# Patient Record
Sex: FEMALE | Race: WHITE | NOT HISPANIC OR LATINO | Employment: PART TIME | URBAN - METROPOLITAN AREA
[De-identification: names, ages, dates, MRNs, and addresses within clinical notes are randomized per-mention and may not be internally consistent; named-entity substitution may affect disease eponyms.]

---

## 2017-02-22 ENCOUNTER — HISTORICAL (OUTPATIENT)
Dept: ADMINISTRATIVE | Facility: HOSPITAL | Age: 57
End: 2017-02-22

## 2017-02-22 LAB
CHOLEST SERPL-MCNC: 228 MG/DL
CHOLEST/HDLC SERPL: 4.9 {RATIO} (ref 0–4.4)
DEPRECATED CALCIDIOL+CALCIFEROL SERPL-MC: 25.47 NG/ML (ref 30–80)
HDLC SERPL-MCNC: 47 MG/DL
LDLC SERPL CALC-MCNC: 143 MG/DL (ref 0–130)
TRIGL SERPL-MCNC: 191 MG/DL
VLDLC SERPL CALC-MCNC: 38 MG/DL

## 2018-02-14 ENCOUNTER — HISTORICAL (OUTPATIENT)
Dept: INTERNAL MEDICINE | Facility: CLINIC | Age: 58
End: 2018-02-14

## 2018-02-14 LAB
ABS NEUT (OLG): 3.88 X10(3)/MCL (ref 2.1–9.2)
ALBUMIN SERPL-MCNC: 4.2 GM/DL (ref 3.4–5)
ALBUMIN/GLOB SERPL: 1 RATIO (ref 1–2)
ALP SERPL-CCNC: 87 UNIT/L (ref 45–117)
ALT SERPL-CCNC: 23 UNIT/L (ref 12–78)
APPEARANCE, UA: CLEAR
AST SERPL-CCNC: 20 UNIT/L (ref 15–37)
BACTERIA #/AREA URNS AUTO: ABNORMAL /[HPF]
BASOPHILS # BLD AUTO: 0.01 X10(3)/MCL
BASOPHILS NFR BLD AUTO: 0 % (ref 0–1)
BILIRUB SERPL-MCNC: 0.6 MG/DL (ref 0.2–1)
BILIRUB UR QL STRIP: NEGATIVE
BILIRUBIN DIRECT+TOT PNL SERPL-MCNC: 0.2 MG/DL
BILIRUBIN DIRECT+TOT PNL SERPL-MCNC: 0.4 MG/DL
BUN SERPL-MCNC: 14 MG/DL (ref 7–18)
CALCIUM SERPL-MCNC: 9.5 MG/DL (ref 8.5–10.1)
CHLORIDE SERPL-SCNC: 106 MMOL/L (ref 98–107)
CHOLEST SERPL-MCNC: 187 MG/DL
CHOLEST/HDLC SERPL: 2.9 {RATIO} (ref 0–4.4)
CO2 SERPL-SCNC: 31 MMOL/L (ref 21–32)
COLOR UR: YELLOW
CREAT SERPL-MCNC: 0.5 MG/DL (ref 0.6–1.3)
DEPRECATED CALCIDIOL+CALCIFEROL SERPL-MC: 25.71 NG/ML (ref 30–80)
EOSINOPHIL # BLD AUTO: 0.15 10*3/UL
EOSINOPHIL NFR BLD AUTO: 2 % (ref 0–5)
ERYTHROCYTE [DISTWIDTH] IN BLOOD BY AUTOMATED COUNT: 12.2 % (ref 11.5–14.5)
EST. AVERAGE GLUCOSE BLD GHB EST-MCNC: 105 MG/DL
GLOBULIN SER-MCNC: 3.4 GM/ML (ref 2.3–3.5)
GLUCOSE (UA): NORMAL
GLUCOSE SERPL-MCNC: 87 MG/DL (ref 74–106)
HAV IGM SERPL QL IA: NONREACTIVE
HBA1C MFR BLD: 5.3 % (ref 4.2–6.3)
HBV CORE IGM SERPL QL IA: NONREACTIVE
HBV SURFACE AG SERPL QL IA: NEGATIVE
HCT VFR BLD AUTO: 36.9 % (ref 35–46)
HCV AB SERPL QL IA: NONREACTIVE
HDLC SERPL-MCNC: 64 MG/DL
HGB BLD-MCNC: 12.4 GM/DL (ref 12–16)
HGB UR QL STRIP: NEGATIVE
HIV 1+2 AB+HIV1 P24 AG SERPL QL IA: NONREACTIVE
HYALINE CASTS #/AREA URNS LPF: ABNORMAL /[LPF]
IMM GRANULOCYTES # BLD AUTO: 0.02 10*3/UL
IMM GRANULOCYTES NFR BLD AUTO: 0 %
KETONES UR QL STRIP: NEGATIVE
LDLC SERPL CALC-MCNC: 104 MG/DL (ref 0–130)
LEUKOCYTE ESTERASE UR QL STRIP: NEGATIVE
LYMPHOCYTES # BLD AUTO: 1.96 X10(3)/MCL
LYMPHOCYTES NFR BLD AUTO: 31 % (ref 15–40)
MCH RBC QN AUTO: 30.5 PG (ref 26–34)
MCHC RBC AUTO-ENTMCNC: 33.6 GM/DL (ref 31–37)
MCV RBC AUTO: 90.7 FL (ref 80–100)
MONOCYTES # BLD AUTO: 0.37 X10(3)/MCL
MONOCYTES NFR BLD AUTO: 6 % (ref 4–12)
NEUTROPHILS # BLD AUTO: 3.88 X10(3)/MCL
NEUTROPHILS NFR BLD AUTO: 61 X10(3)/MCL
NITRITE UR QL STRIP: NEGATIVE
PH UR STRIP: 7 [PH] (ref 4.5–8)
PLATELET # BLD AUTO: 198 X10(3)/MCL (ref 130–400)
PMV BLD AUTO: 10.7 FL (ref 7.4–10.4)
POTASSIUM SERPL-SCNC: 3.5 MMOL/L (ref 3.5–5.1)
PROT SERPL-MCNC: 7.6 GM/DL (ref 6.4–8.2)
PROT UR QL STRIP: NEGATIVE
RBC # BLD AUTO: 4.07 X10(6)/MCL (ref 4–5.2)
RBC #/AREA URNS AUTO: ABNORMAL /[HPF]
SODIUM SERPL-SCNC: 141 MMOL/L (ref 136–145)
SP GR UR STRIP: 1.02 (ref 1–1.03)
SQUAMOUS #/AREA URNS LPF: ABNORMAL /[LPF]
TRIGL SERPL-MCNC: 95 MG/DL
TSH SERPL-ACNC: 1.79 MIU/L (ref 0.36–3.74)
UROBILINOGEN UR STRIP-ACNC: NORMAL
VLDLC SERPL CALC-MCNC: 19 MG/DL
WBC # SPEC AUTO: 6.4 X10(3)/MCL (ref 4.5–11)
WBC #/AREA URNS AUTO: ABNORMAL /HPF

## 2019-02-15 ENCOUNTER — HISTORICAL (OUTPATIENT)
Dept: RADIOLOGY | Facility: HOSPITAL | Age: 59
End: 2019-02-15

## 2019-03-13 ENCOUNTER — HISTORICAL (OUTPATIENT)
Dept: INTERNAL MEDICINE | Facility: CLINIC | Age: 59
End: 2019-03-13

## 2019-03-13 LAB
ABS NEUT (OLG): 4.34 X10(3)/MCL (ref 2.1–9.2)
ALBUMIN SERPL-MCNC: 4 GM/DL (ref 3.4–5)
ALBUMIN/GLOB SERPL: 1.1 RATIO (ref 1.1–2)
ALP SERPL-CCNC: 95 UNIT/L (ref 45–117)
ALT SERPL-CCNC: 24 UNIT/L (ref 12–78)
APPEARANCE, UA: CLEAR
AST SERPL-CCNC: 16 UNIT/L (ref 15–37)
BACTERIA #/AREA URNS AUTO: ABNORMAL /[HPF]
BASOPHILS # BLD AUTO: 0.01 X10(3)/MCL
BASOPHILS NFR BLD AUTO: 0 %
BILIRUB SERPL-MCNC: 0.6 MG/DL (ref 0.2–1)
BILIRUB UR QL STRIP: NEGATIVE
BILIRUBIN DIRECT+TOT PNL SERPL-MCNC: 0.1 MG/DL
BILIRUBIN DIRECT+TOT PNL SERPL-MCNC: 0.5 MG/DL
BUN SERPL-MCNC: 15 MG/DL (ref 7–18)
CALCIUM SERPL-MCNC: 9.2 MG/DL (ref 8.5–10.1)
CHLORIDE SERPL-SCNC: 106 MMOL/L (ref 98–107)
CHOLEST SERPL-MCNC: 225 MG/DL
CHOLEST/HDLC SERPL: 3.4 {RATIO} (ref 0–4.4)
CO2 SERPL-SCNC: 30 MMOL/L (ref 21–32)
COLOR UR: YELLOW
CREAT SERPL-MCNC: 0.5 MG/DL (ref 0.6–1.3)
EOSINOPHIL # BLD AUTO: 0.13 10*3/UL
EOSINOPHIL NFR BLD AUTO: 2 %
ERYTHROCYTE [DISTWIDTH] IN BLOOD BY AUTOMATED COUNT: 12.3 % (ref 11.5–14.5)
EST. AVERAGE GLUCOSE BLD GHB EST-MCNC: 105 MG/DL
GLOBULIN SER-MCNC: 3.6 GM/ML (ref 2.3–3.5)
GLUCOSE (UA): NORMAL
GLUCOSE SERPL-MCNC: 89 MG/DL (ref 74–106)
HAV IGM SERPL QL IA: NONREACTIVE
HBA1C MFR BLD: 5.3 % (ref 4.2–6.3)
HBV CORE IGM SERPL QL IA: NONREACTIVE
HBV SURFACE AG SERPL QL IA: NEGATIVE
HCT VFR BLD AUTO: 42.9 % (ref 35–46)
HCV AB SERPL QL IA: NONREACTIVE
HDLC SERPL-MCNC: 67 MG/DL
HGB BLD-MCNC: 13.6 GM/DL (ref 12–16)
HGB UR QL STRIP: NEGATIVE
HIV 1+2 AB+HIV1 P24 AG SERPL QL IA: NONREACTIVE
HYALINE CASTS #/AREA URNS LPF: ABNORMAL /[LPF]
IMM GRANULOCYTES # BLD AUTO: 0.02 10*3/UL
IMM GRANULOCYTES NFR BLD AUTO: 0 %
KETONES UR QL STRIP: NEGATIVE
LDLC SERPL CALC-MCNC: 132 MG/DL (ref 0–130)
LEUKOCYTE ESTERASE UR QL STRIP: NEGATIVE
LYMPHOCYTES # BLD AUTO: 1.71 X10(3)/MCL
LYMPHOCYTES NFR BLD AUTO: 26 % (ref 13–40)
MCH RBC QN AUTO: 29.2 PG (ref 26–34)
MCHC RBC AUTO-ENTMCNC: 31.7 GM/DL (ref 31–37)
MCV RBC AUTO: 92.3 FL (ref 80–100)
MONOCYTES # BLD AUTO: 0.49 X10(3)/MCL
MONOCYTES NFR BLD AUTO: 7 % (ref 4–12)
NEUTROPHILS # BLD AUTO: 4.34 X10(3)/MCL
NEUTROPHILS NFR BLD AUTO: 65 X10(3)/MCL
NITRITE UR QL STRIP: NEGATIVE
PH UR STRIP: 7.5 [PH] (ref 4.5–8)
PLATELET # BLD AUTO: 217 X10(3)/MCL (ref 130–400)
PMV BLD AUTO: 10.8 FL (ref 7.4–10.4)
POTASSIUM SERPL-SCNC: 4.1 MMOL/L (ref 3.5–5.1)
PROT SERPL-MCNC: 7.6 GM/DL (ref 6.4–8.2)
PROT UR QL STRIP: 10 MG/DL
RBC # BLD AUTO: 4.65 X10(6)/MCL (ref 4–5.2)
RBC #/AREA URNS AUTO: ABNORMAL /[HPF]
SODIUM SERPL-SCNC: 141 MMOL/L (ref 136–145)
SP GR UR STRIP: 1.02 (ref 1–1.03)
SQUAMOUS #/AREA URNS LPF: >100 /[LPF]
TRIGL SERPL-MCNC: 128 MG/DL
TSH SERPL-ACNC: 3.12 MIU/L (ref 0.36–3.74)
UROBILINOGEN UR STRIP-ACNC: NORMAL
VLDLC SERPL CALC-MCNC: 26 MG/DL
WBC # SPEC AUTO: 6.7 X10(3)/MCL (ref 4.5–11)
WBC #/AREA URNS AUTO: ABNORMAL /HPF

## 2020-02-06 ENCOUNTER — HISTORICAL (OUTPATIENT)
Dept: ENDOSCOPY | Facility: HOSPITAL | Age: 60
End: 2020-02-06

## 2020-02-06 LAB — CRC RECOMMENDATION EXT: NORMAL

## 2020-03-23 ENCOUNTER — HISTORICAL (OUTPATIENT)
Dept: ADMINISTRATIVE | Facility: HOSPITAL | Age: 60
End: 2020-03-23

## 2020-08-21 ENCOUNTER — HISTORICAL (OUTPATIENT)
Dept: INTERNAL MEDICINE | Facility: CLINIC | Age: 60
End: 2020-08-21

## 2020-08-21 LAB
ABS NEUT (OLG): 5.03 X10(3)/MCL (ref 2.1–9.2)
ALBUMIN SERPL-MCNC: 4 GM/DL (ref 3.4–5)
ALBUMIN/GLOB SERPL: 1.1 RATIO (ref 1.1–2)
ALP SERPL-CCNC: 79 UNIT/L (ref 45–117)
ALT SERPL-CCNC: 22 UNIT/L (ref 12–78)
APPEARANCE, UA: CLEAR
AST SERPL-CCNC: 15 UNIT/L (ref 15–37)
BACTERIA #/AREA URNS AUTO: ABNORMAL /HPF
BASOPHILS # BLD AUTO: 0 X10(3)/MCL (ref 0–0.2)
BASOPHILS NFR BLD AUTO: 0 %
BILIRUB SERPL-MCNC: 0.4 MG/DL (ref 0.2–1)
BILIRUB UR QL STRIP: NEGATIVE
BILIRUBIN DIRECT+TOT PNL SERPL-MCNC: 0.1 MG/DL (ref 0–0.2)
BILIRUBIN DIRECT+TOT PNL SERPL-MCNC: 0.3 MG/DL
BUN SERPL-MCNC: 12 MG/DL (ref 7–18)
CALCIUM SERPL-MCNC: 9.1 MG/DL (ref 8.5–10.1)
CHLORIDE SERPL-SCNC: 109 MMOL/L (ref 98–107)
CHOLEST SERPL-MCNC: 210 MG/DL
CHOLEST/HDLC SERPL: 3.3 {RATIO} (ref 0–4.4)
CO2 SERPL-SCNC: 29 MMOL/L (ref 21–32)
COLOR UR: YELLOW
CREAT SERPL-MCNC: 0.5 MG/DL (ref 0.6–1.3)
CREAT UR-MCNC: 143 MG/DL
DEPRECATED CALCIDIOL+CALCIFEROL SERPL-MC: 37.8 NG/ML (ref 30–80)
EOSINOPHIL # BLD AUTO: 0.2 X10(3)/MCL (ref 0–0.9)
EOSINOPHIL NFR BLD AUTO: 3 %
ERYTHROCYTE [DISTWIDTH] IN BLOOD BY AUTOMATED COUNT: 12.4 % (ref 11.5–14.5)
EST. AVERAGE GLUCOSE BLD GHB EST-MCNC: 105 MG/DL
GLOBULIN SER-MCNC: 3.6 GM/ML (ref 2.3–3.5)
GLUCOSE (UA): NEGATIVE
GLUCOSE SERPL-MCNC: 97 MG/DL (ref 74–106)
HAV IGM SERPL QL IA: NONREACTIVE
HBA1C MFR BLD: 5.3 % (ref 4.2–6.3)
HBV CORE IGM SERPL QL IA: NONREACTIVE
HBV SURFACE AG SERPL QL IA: NONREACTIVE
HCT VFR BLD AUTO: 37.6 % (ref 35–46)
HCV AB SERPL QL IA: NONREACTIVE
HDLC SERPL-MCNC: 64 MG/DL (ref 40–59)
HGB BLD-MCNC: 12.3 GM/DL (ref 12–16)
HGB UR QL STRIP: 0.1
HIV 1+2 AB+HIV1 P24 AG SERPL QL IA: NONREACTIVE
HYALINE CASTS #/AREA URNS LPF: ABNORMAL /LPF
IMM GRANULOCYTES # BLD AUTO: 0.01 10*3/UL
IMM GRANULOCYTES NFR BLD AUTO: 0 %
KETONES UR QL STRIP: NEGATIVE
LDLC SERPL CALC-MCNC: 129 MG/DL
LEUKOCYTE ESTERASE UR QL STRIP: 75 LEU/UL
LYMPHOCYTES # BLD AUTO: 1.9 X10(3)/MCL (ref 0.6–4.6)
LYMPHOCYTES NFR BLD AUTO: 25 %
MCH RBC QN AUTO: 30.1 PG (ref 26–34)
MCHC RBC AUTO-ENTMCNC: 32.7 GM/DL (ref 31–37)
MCV RBC AUTO: 91.9 FL (ref 80–100)
MICROALBUMIN UR-MCNC: 14.1 MG/L (ref 0–19)
MICROALBUMIN/CREAT RATIO PNL UR: 9.9 MCG/MG CR (ref 0–29)
MONOCYTES # BLD AUTO: 0.5 X10(3)/MCL (ref 0.1–1.3)
MONOCYTES NFR BLD AUTO: 7 %
NEUTROPHILS # BLD AUTO: 5.03 X10(3)/MCL (ref 2.1–9.2)
NEUTROPHILS NFR BLD AUTO: 65 %
NITRITE UR QL STRIP: NEGATIVE
PH UR STRIP: 5.5 [PH] (ref 4.5–8)
PLATELET # BLD AUTO: 203 X10(3)/MCL (ref 130–400)
PMV BLD AUTO: 10.2 FL (ref 7.4–10.4)
POTASSIUM SERPL-SCNC: 3.6 MMOL/L (ref 3.5–5.1)
PROT SERPL-MCNC: 7.6 GM/DL (ref 6.4–8.2)
PROT UR QL STRIP: 10 MG/DL
RBC # BLD AUTO: 4.09 X10(6)/MCL (ref 4–5.2)
RBC #/AREA URNS AUTO: ABNORMAL /HPF
SODIUM SERPL-SCNC: 144 MMOL/L (ref 136–145)
SP GR UR STRIP: 1.03 (ref 1–1.03)
SQUAMOUS #/AREA URNS LPF: ABNORMAL /LPF
TRIGL SERPL-MCNC: 87 MG/DL
TSH SERPL-ACNC: 2.67 MIU/L (ref 0.36–3.74)
UROBILINOGEN UR STRIP-ACNC: 2 MG/DL
VLDLC SERPL CALC-MCNC: 17 MG/DL
WBC # SPEC AUTO: 7.7 X10(3)/MCL (ref 4.5–11)
WBC #/AREA URNS AUTO: ABNORMAL /HPF

## 2020-09-16 ENCOUNTER — HISTORICAL (OUTPATIENT)
Dept: ADMINISTRATIVE | Facility: HOSPITAL | Age: 60
End: 2020-09-16

## 2020-09-16 LAB
CRP SERPL-MCNC: <0.3 MG/DL
ERYTHROCYTE [SEDIMENTATION RATE] IN BLOOD: 8 MM/HR (ref 0–20)
RHEUMATOID FACT SERPL-ACNC: <13 IU/ML

## 2020-10-27 ENCOUNTER — HISTORICAL (OUTPATIENT)
Dept: RADIOLOGY | Facility: HOSPITAL | Age: 60
End: 2020-10-27

## 2020-10-29 ENCOUNTER — HISTORICAL (OUTPATIENT)
Dept: RADIOLOGY | Facility: HOSPITAL | Age: 60
End: 2020-10-29

## 2020-11-02 ENCOUNTER — HISTORICAL (OUTPATIENT)
Dept: RADIOLOGY | Facility: HOSPITAL | Age: 60
End: 2020-11-02

## 2020-12-02 ENCOUNTER — HISTORICAL (OUTPATIENT)
Dept: SLEEP MEDICINE | Facility: HOSPITAL | Age: 60
End: 2020-12-02

## 2020-12-03 ENCOUNTER — HISTORICAL (OUTPATIENT)
Dept: RADIOLOGY | Facility: HOSPITAL | Age: 60
End: 2020-12-03

## 2021-02-19 ENCOUNTER — HISTORICAL (OUTPATIENT)
Dept: INTERNAL MEDICINE | Facility: CLINIC | Age: 61
End: 2021-02-19

## 2021-02-19 LAB
ABS NEUT (OLG): 3.73 X10(3)/MCL (ref 2.1–9.2)
ALBUMIN SERPL-MCNC: 3.9 GM/DL (ref 3.5–5)
ALBUMIN/GLOB SERPL: 1.3 RATIO (ref 1.1–2)
ALP SERPL-CCNC: 68 UNIT/L (ref 40–150)
ALT SERPL-CCNC: 14 UNIT/L (ref 0–55)
AST SERPL-CCNC: 17 UNIT/L (ref 5–34)
BASOPHILS # BLD AUTO: 0 X10(3)/MCL (ref 0–0.2)
BASOPHILS NFR BLD AUTO: 0 %
BILIRUB SERPL-MCNC: 0.5 MG/DL
BILIRUBIN DIRECT+TOT PNL SERPL-MCNC: 0.2 MG/DL (ref 0–0.5)
BILIRUBIN DIRECT+TOT PNL SERPL-MCNC: 0.3 MG/DL (ref 0–0.8)
BUN SERPL-MCNC: 15 MG/DL (ref 9.8–20.1)
CALCIUM SERPL-MCNC: 9.8 MG/DL (ref 8.4–10.2)
CHLORIDE SERPL-SCNC: 104 MMOL/L (ref 98–107)
CO2 SERPL-SCNC: 30 MMOL/L (ref 22–29)
CREAT SERPL-MCNC: 0.64 MG/DL (ref 0.55–1.02)
EOSINOPHIL # BLD AUTO: 0.1 X10(3)/MCL (ref 0–0.9)
EOSINOPHIL NFR BLD AUTO: 2 %
ERYTHROCYTE [DISTWIDTH] IN BLOOD BY AUTOMATED COUNT: 12.3 % (ref 11.5–14.5)
GLOBULIN SER-MCNC: 2.9 GM/DL (ref 2.4–3.5)
GLUCOSE SERPL-MCNC: 105 MG/DL (ref 74–100)
HCT VFR BLD AUTO: 37.7 % (ref 35–46)
HGB BLD-MCNC: 11.9 GM/DL (ref 12–16)
IMM GRANULOCYTES # BLD AUTO: 0.01 10*3/UL
IMM GRANULOCYTES NFR BLD AUTO: 0 %
LYMPHOCYTES # BLD AUTO: 1.6 X10(3)/MCL (ref 0.6–4.6)
LYMPHOCYTES NFR BLD AUTO: 27 %
MCH RBC QN AUTO: 29.5 PG (ref 26–34)
MCHC RBC AUTO-ENTMCNC: 31.6 GM/DL (ref 31–37)
MCV RBC AUTO: 93.5 FL (ref 80–100)
MONOCYTES # BLD AUTO: 0.4 X10(3)/MCL (ref 0.1–1.3)
MONOCYTES NFR BLD AUTO: 7 %
NEUTROPHILS # BLD AUTO: 3.73 X10(3)/MCL (ref 2.1–9.2)
NEUTROPHILS NFR BLD AUTO: 64 %
PLATELET # BLD AUTO: 186 X10(3)/MCL (ref 130–400)
PMV BLD AUTO: 10.5 FL (ref 7.4–10.4)
POTASSIUM SERPL-SCNC: 4.1 MMOL/L (ref 3.5–5.1)
PROT SERPL-MCNC: 6.8 GM/DL (ref 6.4–8.3)
RBC # BLD AUTO: 4.03 X10(6)/MCL (ref 4–5.2)
SODIUM SERPL-SCNC: 142 MMOL/L (ref 136–145)
WBC # SPEC AUTO: 5.9 X10(3)/MCL (ref 4.5–11)

## 2021-09-21 ENCOUNTER — HISTORICAL (OUTPATIENT)
Dept: INTERNAL MEDICINE | Facility: CLINIC | Age: 61
End: 2021-09-21

## 2021-09-21 LAB
ABS NEUT (OLG): 3.8 X10(3)/MCL (ref 2.1–9.2)
ALBUMIN SERPL-MCNC: 4 GM/DL (ref 3.5–5)
ALBUMIN/GLOB SERPL: 1.3 RATIO (ref 1.1–2)
ALP SERPL-CCNC: 71 UNIT/L (ref 40–150)
ALT SERPL-CCNC: 16 UNIT/L (ref 0–55)
AST SERPL-CCNC: 18 UNIT/L (ref 5–34)
BASOPHILS # BLD AUTO: 0 X10(3)/MCL (ref 0–0.2)
BASOPHILS NFR BLD AUTO: 0 %
BILIRUB SERPL-MCNC: 0.7 MG/DL
BILIRUBIN DIRECT+TOT PNL SERPL-MCNC: 0.2 MG/DL (ref 0–0.5)
BILIRUBIN DIRECT+TOT PNL SERPL-MCNC: 0.5 MG/DL (ref 0–0.8)
BUN SERPL-MCNC: 16.7 MG/DL (ref 9.8–20.1)
CALCIUM SERPL-MCNC: 9.8 MG/DL (ref 8.4–10.2)
CHLORIDE SERPL-SCNC: 106 MMOL/L (ref 98–107)
CHOLEST SERPL-MCNC: 199 MG/DL
CHOLEST/HDLC SERPL: 4 {RATIO} (ref 0–5)
CO2 SERPL-SCNC: 30 MMOL/L (ref 22–29)
CREAT SERPL-MCNC: 0.61 MG/DL (ref 0.55–1.02)
EOSINOPHIL # BLD AUTO: 0.3 X10(3)/MCL (ref 0–0.9)
EOSINOPHIL NFR BLD AUTO: 4 %
ERYTHROCYTE [DISTWIDTH] IN BLOOD BY AUTOMATED COUNT: 12.4 % (ref 11.5–14.5)
EST. AVERAGE GLUCOSE BLD GHB EST-MCNC: 96.8 MG/DL
GLOBULIN SER-MCNC: 3 GM/DL (ref 2.4–3.5)
GLUCOSE SERPL-MCNC: 90 MG/DL (ref 74–100)
HBA1C MFR BLD: 5 %
HCT VFR BLD AUTO: 38.1 % (ref 35–46)
HDLC SERPL-MCNC: 54 MG/DL (ref 35–60)
HGB BLD-MCNC: 12.2 GM/DL (ref 12–16)
IMM GRANULOCYTES # BLD AUTO: 0.02 10*3/UL
IMM GRANULOCYTES NFR BLD AUTO: 0 %
LDLC SERPL CALC-MCNC: 129 MG/DL (ref 50–140)
LYMPHOCYTES # BLD AUTO: 1.3 X10(3)/MCL (ref 0.6–4.6)
LYMPHOCYTES NFR BLD AUTO: 22 %
MCH RBC QN AUTO: 30 PG (ref 26–34)
MCHC RBC AUTO-ENTMCNC: 32 GM/DL (ref 31–37)
MCV RBC AUTO: 93.6 FL (ref 80–100)
MONOCYTES # BLD AUTO: 0.5 X10(3)/MCL (ref 0.1–1.3)
MONOCYTES NFR BLD AUTO: 8 %
NEUTROPHILS # BLD AUTO: 3.8 X10(3)/MCL (ref 2.1–9.2)
NEUTROPHILS NFR BLD AUTO: 65 %
NRBC BLD AUTO-RTO: 0 % (ref 0–0.2)
PLATELET # BLD AUTO: 198 X10(3)/MCL (ref 130–400)
PMV BLD AUTO: 10.6 FL (ref 7.4–10.4)
POTASSIUM SERPL-SCNC: 4.3 MMOL/L (ref 3.5–5.1)
PROT SERPL-MCNC: 7 GM/DL (ref 6.4–8.3)
RBC # BLD AUTO: 4.07 X10(6)/MCL (ref 4–5.2)
SODIUM SERPL-SCNC: 142 MMOL/L (ref 136–145)
TRIGL SERPL-MCNC: 79 MG/DL (ref 37–140)
TSH SERPL-ACNC: 2.11 UIU/ML (ref 0.35–4.94)
VLDLC SERPL CALC-MCNC: 16 MG/DL
WBC # SPEC AUTO: 5.9 X10(3)/MCL (ref 4.5–11)

## 2021-12-06 ENCOUNTER — HISTORICAL (OUTPATIENT)
Dept: RADIOLOGY | Facility: HOSPITAL | Age: 61
End: 2021-12-06

## 2022-04-11 ENCOUNTER — HISTORICAL (OUTPATIENT)
Dept: ADMINISTRATIVE | Facility: HOSPITAL | Age: 62
End: 2022-04-11
Payer: COMMERCIAL

## 2022-04-27 VITALS
OXYGEN SATURATION: 98 % | DIASTOLIC BLOOD PRESSURE: 79 MMHG | WEIGHT: 169.75 LBS | BODY MASS INDEX: 30.08 KG/M2 | HEIGHT: 63 IN | SYSTOLIC BLOOD PRESSURE: 119 MMHG

## 2022-09-13 ENCOUNTER — LAB VISIT (OUTPATIENT)
Dept: LAB | Facility: HOSPITAL | Age: 62
End: 2022-09-13
Attending: NURSE PRACTITIONER
Payer: COMMERCIAL

## 2022-09-13 DIAGNOSIS — M85.80 OSTEOPENIA, UNSPECIFIED LOCATION: ICD-10-CM

## 2022-09-13 DIAGNOSIS — R23.2 HOT FLASHES: ICD-10-CM

## 2022-09-13 DIAGNOSIS — M06.9 RHEUMATOID ARTHRITIS, INVOLVING UNSPECIFIED SITE, UNSPECIFIED WHETHER RHEUMATOID FACTOR PRESENT: ICD-10-CM

## 2022-09-13 DIAGNOSIS — Z00.00 WELL ADULT EXAM: Primary | ICD-10-CM

## 2022-09-13 DIAGNOSIS — I87.2 VENOUS REFLUX: ICD-10-CM

## 2022-09-13 LAB
ALBUMIN SERPL-MCNC: 4.1 GM/DL (ref 3.4–4.8)
ALBUMIN/GLOB SERPL: 1.2 RATIO (ref 1.1–2)
ALP SERPL-CCNC: 89 UNIT/L (ref 40–150)
ALT SERPL-CCNC: 15 UNIT/L (ref 0–55)
AST SERPL-CCNC: 16 UNIT/L (ref 5–34)
BASOPHILS # BLD AUTO: 0.02 X10(3)/MCL (ref 0–0.2)
BASOPHILS NFR BLD AUTO: 0.3 %
BILIRUBIN DIRECT+TOT PNL SERPL-MCNC: 0.8 MG/DL
BUN SERPL-MCNC: 13.9 MG/DL (ref 9.8–20.1)
CALCIUM SERPL-MCNC: 10 MG/DL (ref 8.4–10.2)
CHLORIDE SERPL-SCNC: 103 MMOL/L (ref 98–107)
CHOLEST SERPL-MCNC: 219 MG/DL
CHOLEST/HDLC SERPL: 4 {RATIO} (ref 0–5)
CO2 SERPL-SCNC: 28 MMOL/L (ref 23–31)
CREAT SERPL-MCNC: 0.59 MG/DL (ref 0.55–1.02)
DEPRECATED CALCIDIOL+CALCIFEROL SERPL-MC: 36.6 NG/ML (ref 30–80)
EOSINOPHIL # BLD AUTO: 0.17 X10(3)/MCL (ref 0–0.9)
EOSINOPHIL NFR BLD AUTO: 2.5 %
ERYTHROCYTE [DISTWIDTH] IN BLOOD BY AUTOMATED COUNT: 12.4 % (ref 11.5–17)
EST. AVERAGE GLUCOSE BLD GHB EST-MCNC: 99.7 MG/DL
GFR SERPLBLD CREATININE-BSD FMLA CKD-EPI: >60 MLS/MIN/1.73/M2
GLOBULIN SER-MCNC: 3.5 GM/DL (ref 2.4–3.5)
GLUCOSE SERPL-MCNC: 88 MG/DL (ref 82–115)
HBA1C MFR BLD: 5.1 %
HCT VFR BLD AUTO: 40.7 % (ref 37–47)
HDLC SERPL-MCNC: 49 MG/DL (ref 35–60)
HGB BLD-MCNC: 13.2 GM/DL (ref 12–16)
IMM GRANULOCYTES # BLD AUTO: 0.03 X10(3)/MCL (ref 0–0.04)
IMM GRANULOCYTES NFR BLD AUTO: 0.4 %
LDLC SERPL CALC-MCNC: 146 MG/DL (ref 50–140)
LYMPHOCYTES # BLD AUTO: 1.66 X10(3)/MCL (ref 0.6–4.6)
LYMPHOCYTES NFR BLD AUTO: 24.4 %
MCH RBC QN AUTO: 29.7 PG (ref 27–31)
MCHC RBC AUTO-ENTMCNC: 32.4 MG/DL (ref 33–36)
MCV RBC AUTO: 91.5 FL (ref 80–94)
MONOCYTES # BLD AUTO: 0.52 X10(3)/MCL (ref 0.1–1.3)
MONOCYTES NFR BLD AUTO: 7.7 %
NEUTROPHILS # BLD AUTO: 4.4 X10(3)/MCL (ref 2.1–9.2)
NEUTROPHILS NFR BLD AUTO: 64.7 %
NRBC BLD AUTO-RTO: 0 %
PLATELET # BLD AUTO: 205 X10(3)/MCL (ref 130–400)
PMV BLD AUTO: 11.1 FL (ref 7.4–10.4)
POTASSIUM SERPL-SCNC: 4.1 MMOL/L (ref 3.5–5.1)
PROT SERPL-MCNC: 7.6 GM/DL (ref 5.8–7.6)
RBC # BLD AUTO: 4.45 X10(6)/MCL (ref 4.2–5.4)
SODIUM SERPL-SCNC: 143 MMOL/L (ref 136–145)
TRIGL SERPL-MCNC: 119 MG/DL (ref 37–140)
TSH SERPL-ACNC: 3.39 UIU/ML (ref 0.35–4.94)
VLDLC SERPL CALC-MCNC: 24 MG/DL
WBC # SPEC AUTO: 6.8 X10(3)/MCL (ref 4.5–11.5)

## 2022-09-13 PROCEDURE — 80053 COMPREHEN METABOLIC PANEL: CPT

## 2022-09-13 PROCEDURE — 36415 COLL VENOUS BLD VENIPUNCTURE: CPT

## 2022-09-13 PROCEDURE — 82306 VITAMIN D 25 HYDROXY: CPT

## 2022-09-13 PROCEDURE — 84443 ASSAY THYROID STIM HORMONE: CPT

## 2022-09-13 PROCEDURE — 80061 LIPID PANEL: CPT

## 2022-09-13 PROCEDURE — 83036 HEMOGLOBIN GLYCOSYLATED A1C: CPT

## 2022-09-13 PROCEDURE — 85025 COMPLETE CBC W/AUTO DIFF WBC: CPT

## 2022-09-19 ENCOUNTER — OFFICE VISIT (OUTPATIENT)
Dept: INTERNAL MEDICINE | Facility: CLINIC | Age: 62
End: 2022-09-19
Payer: COMMERCIAL

## 2022-09-19 VITALS
TEMPERATURE: 98 F | DIASTOLIC BLOOD PRESSURE: 68 MMHG | HEIGHT: 62 IN | SYSTOLIC BLOOD PRESSURE: 121 MMHG | RESPIRATION RATE: 16 BRPM | WEIGHT: 179.38 LBS | HEART RATE: 65 BPM | BODY MASS INDEX: 33.01 KG/M2

## 2022-09-19 DIAGNOSIS — Z23 FLU VACCINE NEED: ICD-10-CM

## 2022-09-19 DIAGNOSIS — M19.90 OSTEOARTHRITIS, UNSPECIFIED OSTEOARTHRITIS TYPE, UNSPECIFIED SITE: ICD-10-CM

## 2022-09-19 DIAGNOSIS — Z00.00 WELLNESS EXAMINATION: Primary | ICD-10-CM

## 2022-09-19 DIAGNOSIS — E66.9 CLASS 1 OBESITY WITHOUT SERIOUS COMORBIDITY WITH BODY MASS INDEX (BMI) OF 32.0 TO 32.9 IN ADULT, UNSPECIFIED OBESITY TYPE: ICD-10-CM

## 2022-09-19 DIAGNOSIS — E78.5 HYPERLIPIDEMIA, UNSPECIFIED HYPERLIPIDEMIA TYPE: ICD-10-CM

## 2022-09-19 DIAGNOSIS — R23.2 HOT FLASHES: ICD-10-CM

## 2022-09-19 DIAGNOSIS — Z12.31 VISIT FOR SCREENING MAMMOGRAM: ICD-10-CM

## 2022-09-19 DIAGNOSIS — Z78.0 POSTMENOPAUSE: ICD-10-CM

## 2022-09-19 DIAGNOSIS — M85.80 OSTEOPENIA, UNSPECIFIED LOCATION: ICD-10-CM

## 2022-09-19 PROBLEM — I87.2 VENOUS REFLUX: Status: ACTIVE | Noted: 2022-09-19

## 2022-09-19 PROBLEM — M65.312 TRIGGER THUMB, LEFT THUMB: Status: ACTIVE | Noted: 2022-09-19

## 2022-09-19 PROBLEM — M06.9 RHEUMATOID ARTHRITIS: Status: ACTIVE | Noted: 2022-09-19

## 2022-09-19 PROBLEM — G56.00 CTS (CARPAL TUNNEL SYNDROME): Status: ACTIVE | Noted: 2022-09-19

## 2022-09-19 PROBLEM — M72.2 PLANTAR FASCIITIS: Status: ACTIVE | Noted: 2022-09-19

## 2022-09-19 PROBLEM — R06.83 SNORING: Status: ACTIVE | Noted: 2022-09-19

## 2022-09-19 PROBLEM — E66.811 CLASS 1 OBESITY WITHOUT SERIOUS COMORBIDITY IN ADULT: Status: ACTIVE | Noted: 2022-09-19

## 2022-09-19 PROCEDURE — 99396 PREV VISIT EST AGE 40-64: CPT | Mod: S$PBB,,, | Performed by: NURSE PRACTITIONER

## 2022-09-19 PROCEDURE — 3008F PR BODY MASS INDEX (BMI) DOCUMENTED: ICD-10-PCS | Mod: CPTII,,, | Performed by: NURSE PRACTITIONER

## 2022-09-19 PROCEDURE — 1159F MED LIST DOCD IN RCRD: CPT | Mod: CPTII,,, | Performed by: NURSE PRACTITIONER

## 2022-09-19 PROCEDURE — 99215 OFFICE O/P EST HI 40 MIN: CPT | Mod: PBBFAC | Performed by: NURSE PRACTITIONER

## 2022-09-19 PROCEDURE — 3078F DIAST BP <80 MM HG: CPT | Mod: CPTII,,, | Performed by: NURSE PRACTITIONER

## 2022-09-19 PROCEDURE — 3078F PR MOST RECENT DIASTOLIC BLOOD PRESSURE < 80 MM HG: ICD-10-PCS | Mod: CPTII,,, | Performed by: NURSE PRACTITIONER

## 2022-09-19 PROCEDURE — 99396 PR PREVENTIVE VISIT,EST,40-64: ICD-10-PCS | Mod: S$PBB,,, | Performed by: NURSE PRACTITIONER

## 2022-09-19 PROCEDURE — 3074F PR MOST RECENT SYSTOLIC BLOOD PRESSURE < 130 MM HG: ICD-10-PCS | Mod: CPTII,,, | Performed by: NURSE PRACTITIONER

## 2022-09-19 PROCEDURE — 3074F SYST BP LT 130 MM HG: CPT | Mod: CPTII,,, | Performed by: NURSE PRACTITIONER

## 2022-09-19 PROCEDURE — 3008F BODY MASS INDEX DOCD: CPT | Mod: CPTII,,, | Performed by: NURSE PRACTITIONER

## 2022-09-19 PROCEDURE — 1160F PR REVIEW ALL MEDS BY PRESCRIBER/CLIN PHARMACIST DOCUMENTED: ICD-10-PCS | Mod: CPTII,,, | Performed by: NURSE PRACTITIONER

## 2022-09-19 PROCEDURE — 1160F RVW MEDS BY RX/DR IN RCRD: CPT | Mod: CPTII,,, | Performed by: NURSE PRACTITIONER

## 2022-09-19 PROCEDURE — 90471 IMMUNIZATION ADMIN: CPT | Mod: PBBFAC

## 2022-09-19 PROCEDURE — 1159F PR MEDICATION LIST DOCUMENTED IN MEDICAL RECORD: ICD-10-PCS | Mod: CPTII,,, | Performed by: NURSE PRACTITIONER

## 2022-09-19 RX ORDER — MELOXICAM 7.5 MG/1
7.5 TABLET ORAL DAILY PRN
COMMUNITY
Start: 2022-08-02 | End: 2022-09-19 | Stop reason: SDUPTHER

## 2022-09-19 RX ORDER — VENLAFAXINE HYDROCHLORIDE 75 MG/1
75 CAPSULE, EXTENDED RELEASE ORAL DAILY
COMMUNITY
Start: 2022-09-06 | End: 2022-09-19 | Stop reason: SDUPTHER

## 2022-09-19 RX ORDER — VENLAFAXINE HYDROCHLORIDE 75 MG/1
75 CAPSULE, EXTENDED RELEASE ORAL DAILY
Qty: 30 CAPSULE | Refills: 11 | Status: SHIPPED | OUTPATIENT
Start: 2022-09-19

## 2022-09-19 RX ORDER — MELOXICAM 7.5 MG/1
7.5 TABLET ORAL DAILY PRN
Qty: 30 TABLET | Refills: 11 | Status: SHIPPED | OUTPATIENT
Start: 2022-09-19 | End: 2023-10-23

## 2022-09-19 RX ORDER — CLOTRIMAZOLE AND BETAMETHASONE DIPROPIONATE 10; .64 MG/G; MG/G
CREAM TOPICAL 2 TIMES DAILY
COMMUNITY
Start: 2022-04-11

## 2022-09-19 NOTE — PROGRESS NOTES
Miranda L Natasha, NP   [facility]  [address] [address]      PATIENT NAME: Ashley Ruano  : 1960  DATE: 22  MRN: [id number]      Billing Provider: Miranda Kaur NP  Level of Service: UT PREVENTIVE VISIT,NEW,40-64  Patient PCP Information       Provider PCP Type    Miranda Kaur NP General            Reason for Visit / Chief Complaint: wellness (Flu vaccine)       History of Present Illness / Problem Focused Workflow     Ashley Ruano presents to the clinic with wellness (Flu vaccine)     63 yo WF for wellness with lab results. PMh includes CTS, osteopenia, hot flashes, plantar fasciitis, diverticulosis, colon polyps, venous reflux, and obesity. Wants Flu vaccine today. Labs reviewed. . Wt gain 10#. She is no longer working at [employer] since . She overall feels well except continues with hot flashes that affects her sleep. She is not interested in changing medication at this time but appreciates referral to GYN for further discussion and possible change in therapy.  She has tried numerous OTC remedies without relief. Continues on Effexor. Otherwise, denies any fever, chills, HA, dizziness, cough, SOB, CP, abdominal pain, poor appetite, n/v/d, hematochezia.     Other providers   Dr. Stafford      Review of Systems     Review of Systems   Constitutional: Negative.    HENT: Negative.     Eyes: Negative.    Respiratory: Negative.     Cardiovascular: Negative.    Gastrointestinal: Negative.    Endocrine: Negative.         Hot flashes   Genitourinary: Negative.    Musculoskeletal: Negative.    Skin: Negative.    Allergic/Immunologic: Negative.    Neurological: Negative.    Hematological: Negative.    Psychiatric/Behavioral: Negative.       Medical / Social / Family History   History reviewed. No pertinent past medical history.    Past Surgical History:   Procedure Laterality Date     SECTION       CHOLECYSTECTOMY      DILATION AND CURETTAGE OF UTERUS  1978    HYSTERECTOMY      INSERTION, STENT, ARTERY, FEMOROPOPLITEAL  08/23/2021    TONSILLECTOMY         Social History  Ms. Ramirez  reports that she has never smoked. She has never used smokeless tobacco. She reports that she does not currently use alcohol. She reports that she does not use drugs.    Family History  Ms. Ramirez's family history includes Blindness in her mother; Colon cancer in her maternal grandmother; Deafness in her mother; Diabetes in her father; Heart disease in her father; Hypertension in her mother.    Medications and Allergies     Medications  Medication List with Changes/Refills   Current Medications    CLOTRIMAZOLE-BETAMETHASONE 1-0.05% (LOTRISONE) CREAM    Apply topically 2 (two) times daily.   Changed and/or Refilled Medications    Modified Medication Previous Medication    MELOXICAM (MOBIC) 7.5 MG TABLET meloxicam (MOBIC) 7.5 MG tablet       Take 1 tablet (7.5 mg total) by mouth daily as needed for Pain. Take with food/ milk. Avoid other NSAIDs    Take 7.5 mg by mouth daily as needed.    VENLAFAXINE (EFFEXOR-XR) 75 MG 24 HR CAPSULE venlafaxine (EFFEXOR-XR) 75 MG 24 hr capsule       Take 1 capsule (75 mg total) by mouth once daily.    Take 75 mg by mouth once daily.       Allergies  Review of patient's allergies indicates:   Allergen Reactions    Grass pollen-june grass standard Itching    Mayonnaise Nausea And Vomiting       Physical Examination     Vitals:    09/19/22 0812   BP: 121/68   Pulse: 65   Resp: 16   Temp: 98.1 °F (36.7 °C)     Physical Exam  Vitals and nursing note reviewed.   Constitutional:       Appearance: Normal appearance. She is not ill-appearing.   HENT:      Head: Normocephalic.      Right Ear: Tympanic membrane normal.      Left Ear: Tympanic membrane normal.      Nose: Nose normal.      Mouth/Throat:      Mouth: Mucous membranes are moist.   Eyes:      Extraocular Movements: Extraocular movements intact.       Conjunctiva/sclera: Conjunctivae normal.      Pupils: Pupils are equal, round, and reactive to light.   Cardiovascular:      Rate and Rhythm: Normal rate and regular rhythm.      Pulses: Normal pulses.   Pulmonary:      Effort: Pulmonary effort is normal. No respiratory distress.      Breath sounds: Normal breath sounds.   Abdominal:      General: Bowel sounds are normal. There is no distension.      Palpations: Abdomen is soft. There is no mass.      Tenderness: There is no abdominal tenderness.      Hernia: No hernia is present.   Musculoskeletal:         General: Normal range of motion.      Cervical back: Normal range of motion and neck supple.      Right lower leg: No edema.      Left lower leg: No edema.   Skin:     General: Skin is warm and dry.      Capillary Refill: Capillary refill takes less than 2 seconds.   Neurological:      Mental Status: She is alert and oriented to person, place, and time. Mental status is at baseline.      Motor: No weakness.   Psychiatric:         Mood and Affect: Mood normal.         Behavior: Behavior normal.         Thought Content: Thought content normal.         Judgment: Judgment normal.         Results     Lab Results   Component Value Date    WBC 6.8 09/13/2022    RBC 4.45 09/13/2022    HGB 13.2 09/13/2022    HCT 40.7 09/13/2022    MCV 91.5 09/13/2022    MCH 29.7 09/13/2022    MCHC 32.4 (L) 09/13/2022    RDW 12.4 09/13/2022     09/13/2022    MPV 11.1 (H) 09/13/2022     CMP  Sodium Level   Date Value Ref Range Status   09/13/2022 143 136 - 145 mmol/L Final     Potassium Level   Date Value Ref Range Status   09/13/2022 4.1 3.5 - 5.1 mmol/L Final     Carbon Dioxide   Date Value Ref Range Status   09/13/2022 28 23 - 31 mmol/L Final     Blood Urea Nitrogen   Date Value Ref Range Status   09/13/2022 13.9 9.8 - 20.1 mg/dL Final     Creatinine   Date Value Ref Range Status   09/13/2022 0.59 0.55 - 1.02 mg/dL Final     Calcium Level Total   Date Value Ref Range Status    09/13/2022 10.0 8.4 - 10.2 mg/dL Final     Albumin Level   Date Value Ref Range Status   09/13/2022 4.1 3.4 - 4.8 gm/dL Final     Bilirubin Total   Date Value Ref Range Status   09/13/2022 0.8 <=1.5 mg/dL Final     Alkaline Phosphatase   Date Value Ref Range Status   09/13/2022 89 40 - 150 unit/L Final     Aspartate Aminotransferase   Date Value Ref Range Status   09/13/2022 16 5 - 34 unit/L Final     Alanine Aminotransferase   Date Value Ref Range Status   09/13/2022 15 0 - 55 unit/L Final     Estimated GFR-Non    Date Value Ref Range Status   09/21/2021 >105 >>=90 mL/min/1.73 m2 Final     Lab Results   Component Value Date    CHOL 219 (H) 09/13/2022     Lab Results   Component Value Date    HDL 49 09/13/2022     No results found for: LDLCALC  Lab Results   Component Value Date    TRIG 119 09/13/2022     No results found for: CHOLHDL  Lab Results   Component Value Date    TSH 3.3864 09/13/2022     Lab Results   Component Value Date    PHUR 5.5 08/21/2020    PROTEINUA 10 (A) 08/21/2020    GLUCUA Negative 08/21/2020    KETONESU Negative 08/21/2020    OCCULTUA 0.1 08/21/2020    NITRITE Negative 08/21/2020    LEUKOCYTESUR 75 (A) 08/21/2020           Assessment and Plan (including Health Maintenance)     Plan:         Health Maintenance Due   Topic Date Due    Hepatitis C Screening  Never done    Shingles Vaccine (1 of 2) Never done    COVID-19 Vaccine (3 - Booster for Moderna series) 10/11/2021       Problem List Items Addressed This Visit          Cardiac/Vascular    HLD (hyperlipidemia)    Current Assessment & Plan       Avoid tobacco/ alcohol  Follow low fat/low cholesterol diet such as avoid/ decrease lopez, sausage, fried foods, cookies, cakes, chips, cheese, whole milk, butter, mayonnaise. Add olive oil, avocados, lean meats, fresh fruits/ vegetables, heart healthy nuts to diet.  Educated on health benefits of exercise 5 days/ week of at least 30 minutes moderate intensity exercise (brisk  walking) and 2 days/ week of muscle strength activities                Endocrine    Class 1 obesity without serious comorbidity in adult    Current Assessment & Plan     BMI 32.81, wt gain 10#  Educated on increased risk of disease s/t obesity.  Educated on health benefits of at least 5 days/ week of 30 minutes moderate intensity exercise (brisk walking) and 2 or more days/ week of muscle strength activities (as tolerated).  Eat well balanced diet of fresh fruits/ vegetables, whole grains, lean meats and limit high carbohydrate foods.            Relevant Orders    CBC Auto Differential    Comprehensive Metabolic Panel       Orthopedic    Osteoarthritis    Current Assessment & Plan     Stable. Continue with Meloxicam prn         Relevant Medications    meloxicam (MOBIC) 7.5 MG tablet    Other Relevant Orders    CBC Auto Differential    Comprehensive Metabolic Panel    Osteopenia    Current Assessment & Plan     DEXA 2019  She agrees to complete, ordered  Not taking calcium, she states pills are too big; instructed on chewable options   Daily calcium and vitamin D supplementation: Calcium 1500 mg daily; high calcium foods include salmon or sardines with bones, low fat yogurt, skim milk, green vegetables, and cheese. Vitamin D 3 2000 IU once daily; high vitamin D foods include mushrooms, fish oil, cod liver, salmon, tuna, egg yolks, milk fortified with Vitamin D and foods fortified with Vitamin D such as cereals and oatmeal.  Decrease/ Avoid use of alcohol, tobacco use, caffeine.  Educated on health benefits of at least 5 days/ week of 30 minutes moderate intensity exercise (brisk walking) and 2 or more days/ week of muscle strength activities  Fall precautions discussed such as removing scatter and clutter in home, use hand rails when walking up stairs, proper foot wear, and use of bright lights in home especially at night.            Relevant Orders    CBC Auto Differential    Comprehensive Metabolic Panel     Hemoglobin A1C    TSH    Vitamin D       Other    Hot flashes  Offered change in medication therapy, however patient agrees to referral to GYN for further discussion. Can continue with Effexor for now.    Relevant Medications    venlafaxine (EFFEXOR-XR) 75 MG 24 hr capsule    Other Relevant Orders    Ambulatory referral/consult to Gynecology     Other Visit Diagnoses       Wellness examination    -  Primary  Avoid tobacco/ alcohol/ drugs  Regular exercise as tolerated  Healthy lifestyle habits  Wellness labs completed and reviewed  MMG due 12/7/22  CRC screening UTD, due 2/2025  DEXA ordered     Relevant Orders    CBC Auto Differential    Comprehensive Metabolic Panel    Hemoglobin A1C    Lipid Panel    TSH    Vitamin D    Visit for screening mammogram        Relevant Orders    Mammo Digital Screening Bilat w/ Ilya    Flu vaccine need        Postmenopause        Relevant Orders    DXA Bone Density Spine And Hip    Vitamin D            Health Maintenance Topics with due status: Not Due       Topic Last Completion Date    TETANUS VACCINE 01/19/2018    Colorectal Cancer Screening 02/06/2020    Mammogram 12/06/2021    Lipid Panel 09/13/2022       Future Appointments   Date Time Provider Department Center   9/19/2023  7:00 AM Miranda Kaur NP [facility]      Follow up in a year or sooner if needed for wellness with labs.      Signature:  Miranda Kaur NP  [facility]  [address] [address]    Date of encounter: 9/19/22

## 2022-09-19 NOTE — ASSESSMENT & PLAN NOTE
Avoid tobacco/ alcohol  Follow low fat/low cholesterol diet such as avoid/ decrease lopez, sausage, fried foods, cookies, cakes, chips, cheese, whole milk, butter, mayonnaise. Add olive oil, avocados, lean meats, fresh fruits/ vegetables, heart healthy nuts to diet.  Educated on health benefits of exercise 5 days/ week of at least 30 minutes moderate intensity exercise (brisk walking) and 2 days/ week of muscle strength activities

## 2022-09-19 NOTE — ASSESSMENT & PLAN NOTE
BMI 32.81, wt gain 10#  Educated on increased risk of disease s/t obesity.  Educated on health benefits of at least 5 days/ week of 30 minutes moderate intensity exercise (brisk walking) and 2 or more days/ week of muscle strength activities (as tolerated).  Eat well balanced diet of fresh fruits/ vegetables, whole grains, lean meats and limit high carbohydrate foods.

## 2022-09-19 NOTE — ASSESSMENT & PLAN NOTE
DEXA 2019  She agrees to complete, ordered  Not taking calcium, she states pills are too big; instructed on chewable options   Daily calcium and vitamin D supplementation: Calcium 1500 mg daily; high calcium foods include salmon or sardines with bones, low fat yogurt, skim milk, green vegetables, and cheese. Vitamin D 3 2000 IU once daily; high vitamin D foods include mushrooms, fish oil, cod liver, salmon, tuna, egg yolks, milk fortified with Vitamin D and foods fortified with Vitamin D such as cereals and oatmeal.  Decrease/ Avoid use of alcohol, tobacco use, caffeine.  Educated on health benefits of at least 5 days/ week of 30 minutes moderate intensity exercise (brisk walking) and 2 or more days/ week of muscle strength activities  Fall precautions discussed such as removing scatter and clutter in home, use hand rails when walking up stairs, proper foot wear, and use of bright lights in home especially at night.

## 2022-11-28 ENCOUNTER — DOCUMENTATION ONLY (OUTPATIENT)
Dept: ADMINISTRATIVE | Facility: HOSPITAL | Age: 62
End: 2022-11-28
Payer: COMMERCIAL

## 2022-12-07 ENCOUNTER — TELEPHONE (OUTPATIENT)
Dept: INTERNAL MEDICINE | Facility: CLINIC | Age: 62
End: 2022-12-07
Payer: COMMERCIAL

## 2022-12-07 ENCOUNTER — HOSPITAL ENCOUNTER (OUTPATIENT)
Dept: RADIOLOGY | Facility: HOSPITAL | Age: 62
Discharge: HOME | End: 2022-12-07
Attending: NURSE PRACTITIONER
Payer: COMMERCIAL

## 2022-12-07 DIAGNOSIS — Z12.31 VISIT FOR SCREENING MAMMOGRAM: ICD-10-CM

## 2022-12-07 DIAGNOSIS — Z78.0 POSTMENOPAUSE: ICD-10-CM

## 2022-12-07 PROCEDURE — 77063 BREAST TOMOSYNTHESIS BI: CPT | Mod: 26,,, | Performed by: RADIOLOGY

## 2022-12-07 PROCEDURE — 77063 MAMMO DIGITAL SCREENING BILAT WITH TOMO: ICD-10-PCS | Mod: 26,,, | Performed by: RADIOLOGY

## 2022-12-07 PROCEDURE — 77067 SCR MAMMO BI INCL CAD: CPT | Mod: TC

## 2022-12-07 PROCEDURE — 77067 MAMMO DIGITAL SCREENING BILAT WITH TOMO: ICD-10-PCS | Mod: 26,,, | Performed by: RADIOLOGY

## 2022-12-07 PROCEDURE — 77080 DXA BONE DENSITY AXIAL: CPT | Mod: TC

## 2022-12-07 PROCEDURE — 77067 SCR MAMMO BI INCL CAD: CPT | Mod: 26,,, | Performed by: RADIOLOGY

## 2022-12-07 NOTE — TELEPHONE ENCOUNTER
Please let patient know DEXA (bone density scan) shows improvement in lumbar spine however mild progression of osteopenia in femurs. Encourage continued calcium/ vitamin D supplementation daily, avoid excessive caffeine/ tobacco/ alcohol and encourage regular weight bearing activities/ exercises. Fall precautions advised.     Please let me know if she has any further questions/ concerns.     Thanks

## 2023-02-27 ENCOUNTER — OFFICE VISIT (OUTPATIENT)
Dept: GYNECOLOGY | Facility: CLINIC | Age: 63
End: 2023-02-27
Payer: COMMERCIAL

## 2023-02-27 VITALS
HEART RATE: 67 BPM | DIASTOLIC BLOOD PRESSURE: 73 MMHG | SYSTOLIC BLOOD PRESSURE: 140 MMHG | TEMPERATURE: 98 F | HEIGHT: 63 IN | RESPIRATION RATE: 16 BRPM | BODY MASS INDEX: 31.54 KG/M2 | WEIGHT: 178 LBS

## 2023-02-27 DIAGNOSIS — R23.2 HOT FLASHES: ICD-10-CM

## 2023-02-27 PROCEDURE — 99214 OFFICE O/P EST MOD 30 MIN: CPT | Mod: PBBFAC

## 2023-02-27 RX ORDER — GABAPENTIN 300 MG/1
300 CAPSULE ORAL NIGHTLY
Qty: 90 CAPSULE | Refills: 3 | Status: SHIPPED | OUTPATIENT
Start: 2023-02-27 | End: 2024-02-27

## 2023-02-27 NOTE — PROGRESS NOTES
[facility]  [facility]  [address] [place] [address]  Phone: [phone]  Fax: [phone]    Subjective:     Ashley Ruano is a 62 y.o.  who presents  for hot flashes for the past 25 years. Patient had been started on Effexor with little relief so she discontinued. Hot flashes affect her sleep. Also endorses bilateral carpal tunnel and nerve pain. Denies episodes of vaginal bleeding or discharge.     GYN Hx:  S/p total hysterectomy 25+ years ago. Per patient, she had a hydatidiform mole. Vasomotor symptoms happened after this  Denies h/o abnormal paps  Denies h/o STI    MedHx:   Past Medical History:   Diagnosis Date    Personal history of colonic polyps 2020    Dr Yao Diaz       SurgHx:   Past Surgical History:   Procedure Laterality Date     SECTION      CHOLECYSTECTOMY      COLONOSCOPY  2020    Dr. Yao Diaz    DILATION AND CURETTAGE OF UTERUS      HYSTERECTOMY      INSERTION, STENT, ARTERY, FEMOROPOPLITEAL  2021    TONSILLECTOMY         Medications:   Current Outpatient Medications   Medication Instructions    clotrimazole-betamethasone 1-0.05% (LOTRISONE) cream Topical (Top), 2 times daily    gabapentin (NEURONTIN) 300 mg, Oral, Nightly    meloxicam (MOBIC) 7.5 mg, Oral, Daily PRN, Take with food/ milk. Avoid other NSAIDs    venlafaxine (EFFEXOR-XR) 75 mg, Oral, Daily       FM Hx:   Family History   Problem Relation Age of Onset    Hypertension Mother     Deafness Mother     Blindness Mother     Heart disease Father     Diabetes Father     Colon cancer Maternal Grandmother       Denies hx of ovarian, uterine, endometrial, or colon cancer.    Social Hx:   Social History     Tobacco Use    Smoking status: Never    Smokeless tobacco: Never   Substance Use Topics    Alcohol use: Not Currently    Drug use: Never      Denies tobacco, alcohol and illicit drug usage.    Review of Systems  Denies fevers, chills, headache, blurry vision, nausea,  "vomiting, dizziness, or syncope.Denies chest pain, shortness of breath, RUQ pain, or calf pain.    Objective:     Vitals:    23 0958   BP: (!) 140/73   BP Location: Left arm   Patient Position: Sitting   BP Method: Medium (Automatic)   Pulse: 67   Resp: 16   Temp: 98.2 °F (36.8 °C)   TempSrc: Oral   Weight: 80.7 kg (178 lb)   Height: 5' 3" (1.6 m)     Body mass index is 31.53 kg/m².    Physical Exam:     General: alert and oriented, in no acute distress  Lungs: clear to auscultation bilaterally, no conversational dyspnea  Heart: RRR  Abdomen: Soft, non-distended, non tender to palpation, no involuntary guarding, no rebound tenderness normoactive bowel sounds    Note: RN chaperone present for entirety of exam.    Labs  No results found for this or any previous visit (from the past 24 hour(s)).      Imaging  No images are attached to the encounter.    HCM: Has primary NP   Paps: Has not had since hysterectomy. Denies abnormal paps   Mammogram: UTD  Colonoscopy: UTD    Assessment:   62 y.o.  here for vasomotor symptoms     Plan:     Problem List Items Addressed This Visit          Other    Hot flashes     Patient trialed effexor with little improvement  Given nerve symptoms along with vasomotor, will trial gabapentin nightly. Patient worried about effects while driving so will start with only at night.   Counseled on sleep hygiene including loose fitting clothing while sleeping, using a fan.                 Patient and plan were discussed with Dr. Vega.    Meredith Reed MD   [facility], PGY-2      "

## 2023-02-27 NOTE — ASSESSMENT & PLAN NOTE
Patient trialed effexor with little improvement  Given nerve symptoms along with vasomotor, will trial gabapentin nightly. Patient worried about effects while driving so will start with only at night.   Counseled on sleep hygiene including loose fitting clothing while sleeping, using a fan.

## 2023-09-18 ENCOUNTER — LAB VISIT (OUTPATIENT)
Dept: LAB | Facility: HOSPITAL | Age: 63
End: 2023-09-18
Attending: NURSE PRACTITIONER
Payer: COMMERCIAL

## 2023-09-18 DIAGNOSIS — Z00.00 WELLNESS EXAMINATION: ICD-10-CM

## 2023-09-18 DIAGNOSIS — Z78.0 POSTMENOPAUSE: ICD-10-CM

## 2023-09-18 DIAGNOSIS — M85.80 OSTEOPENIA, UNSPECIFIED LOCATION: ICD-10-CM

## 2023-09-18 LAB
DEPRECATED CALCIDIOL+CALCIFEROL SERPL-MC: 35.5 NG/ML (ref 30–80)
EST. AVERAGE GLUCOSE BLD GHB EST-MCNC: 99.7 MG/DL
HBA1C MFR BLD: 5.1 %

## 2023-09-18 PROCEDURE — 36415 COLL VENOUS BLD VENIPUNCTURE: CPT

## 2023-09-18 PROCEDURE — 83036 HEMOGLOBIN GLYCOSYLATED A1C: CPT

## 2023-09-18 PROCEDURE — 82306 VITAMIN D 25 HYDROXY: CPT

## 2023-09-19 ENCOUNTER — TELEPHONE (OUTPATIENT)
Dept: INTERNAL MEDICINE | Facility: CLINIC | Age: 63
End: 2023-09-19

## 2023-09-19 ENCOUNTER — HOSPITAL ENCOUNTER (OUTPATIENT)
Dept: RADIOLOGY | Facility: HOSPITAL | Age: 63
Discharge: HOME | End: 2023-09-19
Attending: NURSE PRACTITIONER
Payer: COMMERCIAL

## 2023-09-19 ENCOUNTER — OFFICE VISIT (OUTPATIENT)
Dept: INTERNAL MEDICINE | Facility: CLINIC | Age: 63
End: 2023-09-19
Payer: COMMERCIAL

## 2023-09-19 VITALS
SYSTOLIC BLOOD PRESSURE: 156 MMHG | RESPIRATION RATE: 20 BRPM | DIASTOLIC BLOOD PRESSURE: 78 MMHG | BODY MASS INDEX: 29.41 KG/M2 | TEMPERATURE: 98 F | HEART RATE: 60 BPM | HEIGHT: 63 IN | WEIGHT: 166 LBS

## 2023-09-19 DIAGNOSIS — G89.29 CHRONIC PAIN OF LEFT KNEE: ICD-10-CM

## 2023-09-19 DIAGNOSIS — G89.29 CHRONIC HEEL PAIN, RIGHT: ICD-10-CM

## 2023-09-19 DIAGNOSIS — Z23 IMMUNIZATION DUE: ICD-10-CM

## 2023-09-19 DIAGNOSIS — E78.2 MIXED HYPERLIPIDEMIA: ICD-10-CM

## 2023-09-19 DIAGNOSIS — Z00.00 WELLNESS EXAMINATION: Primary | ICD-10-CM

## 2023-09-19 DIAGNOSIS — M25.562 CHRONIC PAIN OF LEFT KNEE: ICD-10-CM

## 2023-09-19 DIAGNOSIS — I10 PRIMARY HYPERTENSION: ICD-10-CM

## 2023-09-19 DIAGNOSIS — M65.311 TRIGGER FINGER OF RIGHT THUMB: ICD-10-CM

## 2023-09-19 DIAGNOSIS — M06.9 RHEUMATOID ARTHRITIS, INVOLVING UNSPECIFIED SITE, UNSPECIFIED WHETHER RHEUMATOID FACTOR PRESENT: ICD-10-CM

## 2023-09-19 DIAGNOSIS — M79.671 CHRONIC HEEL PAIN, RIGHT: ICD-10-CM

## 2023-09-19 DIAGNOSIS — R23.2 HOT FLASHES: ICD-10-CM

## 2023-09-19 DIAGNOSIS — Z12.31 VISIT FOR SCREENING MAMMOGRAM: ICD-10-CM

## 2023-09-19 DIAGNOSIS — E07.89 THYROID PAIN: ICD-10-CM

## 2023-09-19 DIAGNOSIS — M19.90 OSTEOARTHRITIS, UNSPECIFIED OSTEOARTHRITIS TYPE, UNSPECIFIED SITE: ICD-10-CM

## 2023-09-19 DIAGNOSIS — M25.551 RIGHT HIP PAIN: ICD-10-CM

## 2023-09-19 PROBLEM — M65.30 TRIGGER FINGER OF RIGHT HAND: Status: ACTIVE | Noted: 2023-09-19

## 2023-09-19 PROBLEM — E66.811 CLASS 1 OBESITY WITHOUT SERIOUS COMORBIDITY IN ADULT: Status: RESOLVED | Noted: 2022-09-19 | Resolved: 2023-09-19

## 2023-09-19 PROBLEM — E66.9 CLASS 1 OBESITY WITHOUT SERIOUS COMORBIDITY IN ADULT: Status: RESOLVED | Noted: 2022-09-19 | Resolved: 2023-09-19

## 2023-09-19 PROCEDURE — 3044F PR MOST RECENT HEMOGLOBIN A1C LEVEL <7.0%: ICD-10-PCS | Mod: CPTII,,, | Performed by: NURSE PRACTITIONER

## 2023-09-19 PROCEDURE — 73130 X-RAY EXAM OF HAND: CPT | Mod: TC,RT

## 2023-09-19 PROCEDURE — 1159F MED LIST DOCD IN RCRD: CPT | Mod: CPTII,,, | Performed by: NURSE PRACTITIONER

## 2023-09-19 PROCEDURE — 73562 X-RAY EXAM OF KNEE 3: CPT | Mod: TC,LT

## 2023-09-19 PROCEDURE — 99212 OFFICE O/P EST SF 10 MIN: CPT | Mod: S$PBB,25,, | Performed by: NURSE PRACTITIONER

## 2023-09-19 PROCEDURE — 3078F DIAST BP <80 MM HG: CPT | Mod: CPTII,,, | Performed by: NURSE PRACTITIONER

## 2023-09-19 PROCEDURE — 99212 PR OFFICE/OUTPT VISIT, EST, LEVL II, 10-19 MIN: ICD-10-PCS | Mod: S$PBB,25,, | Performed by: NURSE PRACTITIONER

## 2023-09-19 PROCEDURE — 90750 HZV VACC RECOMBINANT IM: CPT | Mod: PBBFAC

## 2023-09-19 PROCEDURE — 73630 X-RAY EXAM OF FOOT: CPT | Mod: TC,RT

## 2023-09-19 PROCEDURE — 1160F PR REVIEW ALL MEDS BY PRESCRIBER/CLIN PHARMACIST DOCUMENTED: ICD-10-PCS | Mod: CPTII,,, | Performed by: NURSE PRACTITIONER

## 2023-09-19 PROCEDURE — 3044F HG A1C LEVEL LT 7.0%: CPT | Mod: CPTII,,, | Performed by: NURSE PRACTITIONER

## 2023-09-19 PROCEDURE — 99215 OFFICE O/P EST HI 40 MIN: CPT | Mod: PBBFAC,25 | Performed by: NURSE PRACTITIONER

## 2023-09-19 PROCEDURE — 3077F PR MOST RECENT SYSTOLIC BLOOD PRESSURE >= 140 MM HG: ICD-10-PCS | Mod: CPTII,,, | Performed by: NURSE PRACTITIONER

## 2023-09-19 PROCEDURE — 99396 PR PREVENTIVE VISIT,EST,40-64: ICD-10-PCS | Mod: S$PBB,,, | Performed by: NURSE PRACTITIONER

## 2023-09-19 PROCEDURE — 90472 IMMUNIZATION ADMIN EACH ADD: CPT | Mod: PBBFAC

## 2023-09-19 PROCEDURE — 3077F SYST BP >= 140 MM HG: CPT | Mod: CPTII,,, | Performed by: NURSE PRACTITIONER

## 2023-09-19 PROCEDURE — 99396 PREV VISIT EST AGE 40-64: CPT | Mod: S$PBB,,, | Performed by: NURSE PRACTITIONER

## 2023-09-19 PROCEDURE — 73502 X-RAY EXAM HIP UNI 2-3 VIEWS: CPT | Mod: TC,RT

## 2023-09-19 PROCEDURE — 3008F PR BODY MASS INDEX (BMI) DOCUMENTED: ICD-10-PCS | Mod: CPTII,,, | Performed by: NURSE PRACTITIONER

## 2023-09-19 PROCEDURE — 90686 IIV4 VACC NO PRSV 0.5 ML IM: CPT | Mod: PBBFAC

## 2023-09-19 PROCEDURE — 3008F BODY MASS INDEX DOCD: CPT | Mod: CPTII,,, | Performed by: NURSE PRACTITIONER

## 2023-09-19 PROCEDURE — 1160F RVW MEDS BY RX/DR IN RCRD: CPT | Mod: CPTII,,, | Performed by: NURSE PRACTITIONER

## 2023-09-19 PROCEDURE — 1159F PR MEDICATION LIST DOCUMENTED IN MEDICAL RECORD: ICD-10-PCS | Mod: CPTII,,, | Performed by: NURSE PRACTITIONER

## 2023-09-19 PROCEDURE — 3078F PR MOST RECENT DIASTOLIC BLOOD PRESSURE < 80 MM HG: ICD-10-PCS | Mod: CPTII,,, | Performed by: NURSE PRACTITIONER

## 2023-09-19 RX ORDER — IBUPROFEN 200 MG
200 TABLET ORAL EVERY 6 HOURS PRN
COMMUNITY
End: 2023-10-23

## 2023-09-19 RX ORDER — ACETAMINOPHEN 500 MG
500 TABLET ORAL EVERY 6 HOURS PRN
COMMUNITY

## 2023-09-19 RX ADMIN — Medication 0.5 ML: at 08:09

## 2023-09-19 NOTE — TELEPHONE ENCOUNTER
Patient signed release of information for Dr. Stafford's records. Please follow up with Dr. Stafford's office on last visit notes/ labs completed within the last 6 months - 1 year.     Please send to me for review.    Thank you

## 2023-09-19 NOTE — PROGRESS NOTES
Miranda L Natasha, NP   [facility]  [address] [address]      PATIENT NAME: Ashley Ruano  : 1960  DATE: 23  MRN: [id number]        Reason for Visit / Chief Complaint: Results, Hand Pain (Right ), Foot Pain (right), and Knee Pain (left)       History of Present Illness / Problem Focused Workflow     Ashley Ruano presents to the clinic with Results, Hand Pain (Right ), Foot Pain (right), and Knee Pain (left)     64 yo WF for wellness with lab results. PMh includes CTS, osteopenia, osteoarthritis, hot flashes, plantar fasciitis, diverticulosis, colon polyps, venous reflux, and obesity. Overall feeling okay except generalized joint pain. Labs reviewed. A1c wnl. Vit D wnl. Wants Flu and Shingrix vaccines today. Last colonoscopy . MMG due 2023.     She is c/o right hip pain, right hand thumb trigger finger (right hand dominant), left knee and right foot/heel pain. She has h/o heel spurs and received injection previously with podiatrist that lasted a while until recently. She feels because of her right heel pain it is causing gait difficulty with subsequent left knee and right hip pain. She takes Tylenol with little relief. Has not thought to try Meloxicam. She has Gabapentin prescribed per GYN provider 2023 for hot flashes. Denies any fever, chills, night sweats, body aches, weakness, CP, SOB, sore throat, blurred vision, tinnitus, abd pain, n/v/d, bloody stools, vaginal bleeding, hematuria, lower extremity swelling/ numbness/ tingling.     Other providers   Dr. Stafford  [facility]        Review of Systems     Review of Systems   Constitutional: Negative.    HENT: Negative.     Eyes: Negative.    Respiratory: Negative.     Cardiovascular: Negative.    Gastrointestinal: Negative.    Endocrine: Negative.    Genitourinary: Negative.    Musculoskeletal:  Positive for arthralgias, joint swelling and myalgias.   Skin: Negative.     Allergic/Immunologic: Negative.    Neurological: Negative.    Hematological: Negative.    Psychiatric/Behavioral: Negative.         Medical / Social / Family History     ----------------------------  Personal history of colonic polyps      Comment:  Dr Yao Diaz     Past Surgical History:   Procedure Laterality Date     SECTION      CHOLECYSTECTOMY      COLONOSCOPY  2020    Dr. Yao Diaz    DILATION AND CURETTAGE OF UTERUS      HYSTERECTOMY      INSERTION, STENT, ARTERY, FEMOROPOPLITEAL  2021    TONSILLECTOMY         Social History     Socioeconomic History    Marital status:     Number of children: 3   Occupational History    Occupation:      Comment: [employer]     Comment: Works at [employer]   Tobacco Use    Smoking status: Never    Smokeless tobacco: Never   Substance and Sexual Activity    Alcohol use: Not Currently    Drug use: Never    Sexual activity: Yes     Partners: Male     Social Determinants of Health     Physical Activity: Inactive (2023)    Exercise Vital Sign     Days of Exercise per Week: 0 days     Minutes of Exercise per Session: 0 min        Family History   Problem Relation Age of Onset    Hypertension Mother     Deafness Mother     Blindness Mother     Alzheimer's disease Mother     Heart disease Father     Diabetes Father     Colon cancer Maternal Grandmother         Medications and Allergies     Medications  Current Outpatient Medications   Medication Instructions    acetaminophen (TYLENOL) 500 mg, Oral, Every 6 hours PRN    clotrimazole-betamethasone 1-0.05% (LOTRISONE) cream Topical (Top), 2 times daily    gabapentin (NEURONTIN) 300 mg, Oral, Nightly    ibuprofen (ADVIL,MOTRIN) 200 mg, Oral, Every 6 hours PRN    meloxicam (MOBIC) 7.5 mg, Oral, Daily PRN, Take with food/ milk. Avoid other NSAIDs    venlafaxine (EFFEXOR-XR) 75 mg, Oral, Daily       Allergies  Review of patient's allergies indicates:   Allergen  "Reactions    Grass pollen-june grass standard Itching    [facility] Nausea And Vomiting       Physical Examination     Visit Vitals  BP (!) 156/78 (BP Location: Left arm, Patient Position: Sitting, BP Method: Large (Manual))   Pulse 60   Temp 98.2 °F (36.8 °C) (Oral)   Resp 20   Ht 5' 3" (1.6 m)   Wt 75.3 kg (166 lb)   LMP  (LMP Unknown)   BMI 29.41 kg/m²       Physical Exam  Vitals and nursing note reviewed.   Constitutional:       Appearance: Normal appearance. She is not ill-appearing.   HENT:      Head: Normocephalic.      Right Ear: Tympanic membrane, ear canal and external ear normal.      Left Ear: Tympanic membrane, ear canal and external ear normal.      Nose: Nose normal. No congestion.      Mouth/Throat:      Mouth: Mucous membranes are moist.   Eyes:      Extraocular Movements: Extraocular movements intact.      Conjunctiva/sclera: Conjunctivae normal.      Pupils: Pupils are equal, round, and reactive to light.   Neck:      Thyroid: Thyroid tenderness present. No thyroid mass or thyromegaly.      Vascular: No carotid bruit.   Cardiovascular:      Rate and Rhythm: Normal rate and regular rhythm.      Pulses: Normal pulses.   Pulmonary:      Effort: Pulmonary effort is normal. No respiratory distress.      Breath sounds: Normal breath sounds.   Abdominal:      General: Bowel sounds are normal. There is no distension.      Palpations: Abdomen is soft. There is no mass.      Tenderness: There is no abdominal tenderness.      Hernia: No hernia is present.   Musculoskeletal:         General: Normal range of motion.      Cervical back: Normal range of motion and neck supple. No tenderness.      Right lower leg: No edema.      Left lower leg: No edema.   Lymphadenopathy:      Cervical: No cervical adenopathy.   Skin:     General: Skin is warm and dry.      Capillary Refill: Capillary refill takes less than 2 seconds.   Neurological:      Mental Status: She is alert and oriented to person, place, and time. " "Mental status is at baseline.      Motor: No weakness.   Psychiatric:         Mood and Affect: Mood normal.         Behavior: Behavior normal.         Thought Content: Thought content normal.         Judgment: Judgment normal.           Results     Lab Results   Component Value Date    WBC 6.8 09/13/2022    RBC 4.45 09/13/2022    HGB 13.2 09/13/2022    HCT 40.7 09/13/2022    MCV 91.5 09/13/2022    MCH 29.7 09/13/2022    MCHC 32.4 (L) 09/13/2022    RDW 12.4 09/13/2022     09/13/2022    MPV 11.1 (H) 09/13/2022     Sodium Level   Date Value Ref Range Status   09/13/2022 143 136 - 145 mmol/L Final     Potassium Level   Date Value Ref Range Status   09/13/2022 4.1 3.5 - 5.1 mmol/L Final     Carbon Dioxide   Date Value Ref Range Status   09/13/2022 28 23 - 31 mmol/L Final     Blood Urea Nitrogen   Date Value Ref Range Status   09/13/2022 13.9 9.8 - 20.1 mg/dL Final     Creatinine   Date Value Ref Range Status   09/13/2022 0.59 0.55 - 1.02 mg/dL Final     Calcium Level Total   Date Value Ref Range Status   09/13/2022 10.0 8.4 - 10.2 mg/dL Final     Albumin Level   Date Value Ref Range Status   09/13/2022 4.1 3.4 - 4.8 gm/dL Final     Bilirubin Total   Date Value Ref Range Status   09/13/2022 0.8 <=1.5 mg/dL Final     Alkaline Phosphatase   Date Value Ref Range Status   09/13/2022 89 40 - 150 unit/L Final     Aspartate Aminotransferase   Date Value Ref Range Status   09/13/2022 16 5 - 34 unit/L Final     Alanine Aminotransferase   Date Value Ref Range Status   09/13/2022 15 0 - 55 unit/L Final     Estimated GFR-Non    Date Value Ref Range Status   09/21/2021 >105 >>=90 mL/min/1.73 m2 Final     Lab Results   Component Value Date    CHOL 219 (H) 09/13/2022     Lab Results   Component Value Date    HDL 49 09/13/2022     No results found for: "LDLCALC"  Lab Results   Component Value Date    TRIG 119 09/13/2022     No results found for: "CHOLHDL"  Lab Results   Component Value Date    TSH 3.3864 09/13/2022 " "    Lab Results   Component Value Date    PHUR 5.5 08/21/2020    PROTEINUA 10 (A) 08/21/2020    GLUCUA Negative 08/21/2020    KETONESU Negative 08/21/2020    OCCULTUA 0.1 08/21/2020    NITRITE Negative 08/21/2020    LEUKOCYTESUR 75 (A) 08/21/2020     Lab Results   Component Value Date    HGBA1C 5.1 09/18/2023    HGBA1C 5.1 09/13/2022    HGBA1C 5.0 09/21/2021     No results found for: "MICROALBUR", "XJAG70CHE"   No results found for this or any previous visit.         Assessment       ICD-10-CM ICD-9-CM   1. Wellness examination  Z00.00 V70.0   2. Trigger finger of right thumb  M65.311 727.03   3. Chronic heel pain, right  M79.671 729.5    G89.29 338.29   4. Chronic pain of left knee  M25.562 719.46    G89.29 338.29   5. Right hip pain  M25.551 719.45   6. Primary hypertension  I10 401.9   7. Visit for screening mammogram  Z12.31 V76.12   8. Immunization due  Z23 V05.9   9. Rheumatoid arthritis, involving unspecified site, unspecified whether rheumatoid factor present  M06.9 714.0   10. Osteoarthritis, unspecified osteoarthritis type, unspecified site  M19.90 715.90   11. Hot flashes  R23.2 782.62   12. BMI 29.0-29.9,adult  Z68.29 V85.25   13. Mixed hyperlipidemia  E78.2 272.2       Plan       1. Trigger finger of right thumb  Right hand pain, thumb pain, trigger finger and right hand dominant  XR right hand, pt will return to complete XR  She is interested in PT/ Ortho referral if warrants   Encouraged to take Meloxicam as prescribed prn with food/ milk.   - X-Ray Hand Complete Right; Future    2. Chronic heel pain, right  H/o calcaneal spurs with acute flare up of right heel pain.   Previously received injection per podiatrist with relief and interested in doing so again if able to   XR right foot  - X-Ray Foot Complete Right; Future    3. Chronic pain of left knee  C/o left knee pain and swelling, chronic  XR left knee ordered  Pt interested in PT - [facility]   - X-Ray Knee 3 View Left; Future    4. Right hip " pain  Patient with right hip pain since right foot/ heel pain starting.   XR b/l hips ordered  Pt interested in PT - [facility]   - X-Ray Hip 2 or 3 views Right (with Pelvis when performed); Future    5. Primary hypertension  /78, asymptomatic  Has home BP machine; encouraged to keep log and bring to visit for review in 2 weeks as she is not interested in medication  Follow low sodium diet, < 2 gm/day (avoid high salty foods such as processed meats/ sausage/lopez/ sandwich meat, chips, pickles, cheese, crackers and soft drinks/ electrolyte replacement drinks).  Avoid tobacco/ alcohol use  Educated on health benefits of at least 5 days/ week of 30 minutes moderate intensity exercise (brisk walking) and 2 or more days/ week of muscle strength activities  Daily ASA 81 mg for CV prevention  Continue current medication regimen      6. Wellness examination  Avoid tobacco/alcohol/ drugs  Regular exercise as tolerated  Healthy lifestyle habits  MMG ordered, due 12/2023  Colonoscopy 2020, recall 5 years   Flu/ Shingrix vaccine today     7. Visit for screening mammogram  - Mammo Digital Screening Bilat w/ [facility]; Future    8. Immunization due  - varicella-zoster [id number] (PF)(SHINGRIX) 50 mcg/0.5 mL injection  - Influenza - Quadrivalent *Preferred* (6 months+) (PF)    9. Rheumatoid arthritis, involving unspecified site, unspecified whether rheumatoid factor present  Pt was previously seen by Dr. Hylton about a year ago.     10. Osteoarthritis, unspecified osteoarthritis type, unspecified site  Pt with chronic OA/ RA symptoms.   RICE encouraged  May use Tylenol/ Meloxicam PRN.     11. Hot flashes  She was prescribed Gabapentin per Gyn provider but causes constipation therefore not taking regularly but admits it works.     12. BMI 29.0-29.9,adult  BMI 29.41  Educated on increased risk of disease s/t obesity.  Educated on health benefits of at least 5 days/ week of 30 minutes moderate intensity exercise (brisk walking) and 2  or more days/ week of muscle strength activities (as tolerated).  Eat well balanced diet of fresh fruits/ vegetables, whole grains, lean meats and limit high carbohydrate foods.       13. Mixed hyperlipidemia  She reports recent labs completed at Dr. Stafford's office within the last 6 months with improved readings- need copy of results for review  Avoid tobacco/ alcohol  Follow low fat/low cholesterol diet such as avoid/ decrease lopez, sausage, fried foods, cookies, cakes, chips, cheese, whole milk, butter, mayonnaise. Add olive oil, avocados, lean meats, fresh fruits/ vegetables, heart healthy nuts to diet.  Educated on health benefits of exercise 5 days/ week of at least 30 minutes moderate intensity exercise (brisk walking) and 2 days/ week of muscle strength activities    14. Thyroid pain   Right thyroid tenderness with questionable nodule vs lymph node palpable. US soft tissue neck/ thyroid ordered for eval         Future Appointments   Date Time Provider Department Center   10/16/2023  9:30 AM Miranda Kaur [facility]   11/6/2023  8:45 AM [facility]ID[facility], [facility] GYN [facility] GYN [facility]        Follow up in about 3 weeks (around 10/10/2023) for HTN with BP log .    Signature:  Miranda Kaur NP  [facility]  [address] [address]    Date of encounter: 9/19/23

## 2023-09-21 ENCOUNTER — TELEPHONE (OUTPATIENT)
Dept: INTERNAL MEDICINE | Facility: CLINIC | Age: 63
End: 2023-09-21
Payer: COMMERCIAL

## 2023-09-21 DIAGNOSIS — M15.9 PRIMARY OSTEOARTHRITIS INVOLVING MULTIPLE JOINTS: Primary | ICD-10-CM

## 2023-09-25 ENCOUNTER — TELEPHONE (OUTPATIENT)
Dept: INTERNAL MEDICINE | Facility: CLINIC | Age: 63
End: 2023-09-25
Payer: COMMERCIAL

## 2023-09-25 NOTE — TELEPHONE ENCOUNTER
Notes received from [facility] and reviewed with lipid results from 4/2023. Can we please have lipid results imported into patient's results?     Thank you

## 2023-09-26 NOTE — TELEPHONE ENCOUNTER
Call pt spouse to relay the message he confirmed understanding and will let her know  
Called pt no answer wasn't able to leave a vm  
Please inform pt XR of right foot, right hand, left knee and right hip show arthritis. I placed order for PT to [facility]. If pt does not receive call within 2-3 weeks, pt to notify provider please.     Thank you   
No

## 2023-09-27 ENCOUNTER — TELEPHONE (OUTPATIENT)
Dept: INTERNAL MEDICINE | Facility: CLINIC | Age: 63
End: 2023-09-27
Payer: COMMERCIAL

## 2023-09-27 ENCOUNTER — HOSPITAL ENCOUNTER (OUTPATIENT)
Dept: RADIOLOGY | Facility: HOSPITAL | Age: 63
Discharge: HOME | End: 2023-09-27
Attending: NURSE PRACTITIONER
Payer: COMMERCIAL

## 2023-09-27 DIAGNOSIS — E07.89 THYROID PAIN: ICD-10-CM

## 2023-09-27 PROCEDURE — 76536 US EXAM OF HEAD AND NECK: CPT | Mod: TC

## 2023-09-27 NOTE — TELEPHONE ENCOUNTER
Please let patient know ultrasound of neck/thyroid did not show any abnormal findings.    Thank you

## 2023-10-13 ENCOUNTER — TELEPHONE (OUTPATIENT)
Dept: INTERNAL MEDICINE | Facility: CLINIC | Age: 63
End: 2023-10-13
Payer: COMMERCIAL

## 2023-10-20 DIAGNOSIS — M19.90 OSTEOARTHRITIS, UNSPECIFIED OSTEOARTHRITIS TYPE, UNSPECIFIED SITE: ICD-10-CM

## 2023-10-23 RX ORDER — MELOXICAM 7.5 MG/1
TABLET ORAL
Qty: 30 TABLET | Refills: 11 | Status: SHIPPED | OUTPATIENT
Start: 2023-10-23

## 2023-11-13 ENCOUNTER — TELEPHONE (OUTPATIENT)
Dept: INTERNAL MEDICINE | Facility: CLINIC | Age: 63
End: 2023-11-13
Payer: COMMERCIAL

## 2023-11-13 NOTE — TELEPHONE ENCOUNTER
Pt notified mammogram isnt due til December 7th and gave number for her to call and schedule. Pt verbalized understanding and will call with any questions or concerns

## 2023-11-13 NOTE — TELEPHONE ENCOUNTER
It was ordered at her last visit in September.  She is not due until after December 7th of this year.  She can contact scheduling to schedule at [phone].      Thank

## 2023-12-08 ENCOUNTER — HOSPITAL ENCOUNTER (OUTPATIENT)
Dept: RADIOLOGY | Facility: HOSPITAL | Age: 63
Discharge: HOME | End: 2023-12-08
Attending: NURSE PRACTITIONER
Payer: COMMERCIAL

## 2023-12-08 DIAGNOSIS — Z12.31 VISIT FOR SCREENING MAMMOGRAM: ICD-10-CM

## 2023-12-08 PROCEDURE — 77067 SCR MAMMO BI INCL CAD: CPT | Mod: TC

## 2023-12-08 PROCEDURE — 77067 MAMMO DIGITAL SCREENING BILAT WITH TOMO: ICD-10-PCS | Mod: 26,,, | Performed by: RADIOLOGY

## 2023-12-08 PROCEDURE — 77067 SCR MAMMO BI INCL CAD: CPT | Mod: 26,,, | Performed by: RADIOLOGY

## 2023-12-08 PROCEDURE — 77063 MAMMO DIGITAL SCREENING BILAT WITH TOMO: ICD-10-PCS | Mod: 26,,, | Performed by: RADIOLOGY

## 2023-12-08 PROCEDURE — 77063 BREAST TOMOSYNTHESIS BI: CPT | Mod: 26,,, | Performed by: RADIOLOGY

## 2025-01-07 DIAGNOSIS — Z12.11 COLON CANCER SCREENING: Primary | ICD-10-CM

## 2025-01-07 RX ORDER — POLYETHYLENE GLYCOL 3350, SODIUM SULFATE, SODIUM CHLORIDE, POTASSIUM CHLORIDE, SODIUM ASCORBATE, AND ASCORBIC ACID 7.5-2.691G
KIT ORAL
Qty: 1 KIT | Refills: 0 | Status: SHIPPED | OUTPATIENT
Start: 2025-01-07

## 2025-01-24 RX ORDER — PREGABALIN 150 MG/1
150 CAPSULE ORAL DAILY
COMMUNITY

## 2025-02-05 ENCOUNTER — ANESTHESIA EVENT (OUTPATIENT)
Dept: ENDOSCOPY | Facility: HOSPITAL | Age: 65
End: 2025-02-05
Payer: COMMERCIAL

## 2025-02-05 NOTE — ANESTHESIA PREPROCEDURE EVALUATION
02/05/2025  Ashley Ruano is a 64 y.o., female with PMHx of HTN, HLD, RA  for CLN secondary to h/o colon polyps.    NO BETA BLOCKER USE    Active Ambulatory Problems     Diagnosis Date Noted    Osteoarthritis 09/19/2022    Trigger thumb, left thumb 09/19/2022    CTS (carpal tunnel syndrome) 09/19/2022    Hot flashes 09/19/2022    Osteopenia 09/19/2022    Plantar fasciitis 09/19/2022    Rheumatoid arthritis 09/19/2022    Snoring 09/19/2022    Venous reflux 09/19/2022    HLD (hyperlipidemia) 09/19/2022    Trigger finger of right hand 09/19/2023    Chronic heel pain, right 09/19/2023    Chronic pain of left knee 09/19/2023    Right hip pain 09/19/2023    Primary hypertension 09/19/2023    BMI 29.0-29.9,adult 09/19/2023    Thyroid pain 09/19/2023     Resolved Ambulatory Problems     Diagnosis Date Noted    Class 1 obesity without serious comorbidity in adult 09/19/2022     Past Medical History:   Diagnosis Date    Personal history of colonic polyps 02/06/2020       Pre-op Assessment    I have reviewed the NPO Status.      Review of Systems  Anesthesia Hx:  No problems with previous Anesthesia   History of prior surgery of interest to airway management or planning:             Social:  Non-Smoker       Cardiovascular:     Hypertension, poorly controlled           hyperlipidemia                               Pulmonary:  Pulmonary Normal                       Renal/:  Renal/ Normal                 Hepatic/GI:  Hepatic/GI Normal                    Musculoskeletal:  Arthritis               Neurological:  Neurology Normal                                      Endocrine:  Endocrine Normal              Vitals:    02/06/25 0650 02/06/25 0740   BP: (!) 157/72    BP Location: Left arm    Patient Position: Lying    Pulse: 68    Resp: (!) 68    Temp: 36.8 °C (98.2 °F)    TempSrc: Oral    SpO2: 98% 100%   Weight: 74.4 kg  "(164 lb)    Height: 5' 3" (1.6 m)          Physical Exam  General: Alert, Cooperative and Well nourished    Airway:  Mallampati: II   Mouth Opening: Normal  TM Distance: Normal  Tongue: Normal  Neck ROM: Normal ROM    Dental:  Intact    Chest/Lungs:  Normal Respiratory Rate, Clear to auscultation    Heart:  Rate: Normal  Rhythm: Regular Rhythm  Sounds: Normal      Lab Results   Component Value Date    WBC 6.8 09/13/2022    HGB 13.2 09/13/2022    HCT 40.7 09/13/2022    MCV 91.5 09/13/2022     09/13/2022       CMP  Sodium   Date Value Ref Range Status   09/13/2022 143 136 - 145 mmol/L Final     Potassium   Date Value Ref Range Status   09/13/2022 4.1 3.5 - 5.1 mmol/L Final     Chloride   Date Value Ref Range Status   09/13/2022 103 98 - 107 mmol/L Final     CO2   Date Value Ref Range Status   09/13/2022 28 23 - 31 mmol/L Final     Blood Urea Nitrogen   Date Value Ref Range Status   09/13/2022 13.9 9.8 - 20.1 mg/dL Final     Creatinine   Date Value Ref Range Status   09/13/2022 0.59 0.55 - 1.02 mg/dL Final     Calcium   Date Value Ref Range Status   09/13/2022 10.0 8.4 - 10.2 mg/dL Final     Albumin   Date Value Ref Range Status   09/13/2022 4.1 3.4 - 4.8 gm/dL Final     Bilirubin Total   Date Value Ref Range Status   09/13/2022 0.8 <=1.5 mg/dL Final     ALP   Date Value Ref Range Status   09/13/2022 89 40 - 150 unit/L Final     AST   Date Value Ref Range Status   09/13/2022 16 5 - 34 unit/L Final     ALT   Date Value Ref Range Status   09/13/2022 15 0 - 55 unit/L Final     eGFR   Date Value Ref Range Status   09/13/2022 >60 mls/min/1.73/m2 Final         Anesthesia Plan  Type of Anesthesia, risks & benefits discussed:    Anesthesia Type: Gen Natural Airway  Intra-op Monitoring Plan: Standard ASA Monitors  Post Op Pain Control Plan: IV/PO Opioids PRN  Induction:  IV  Informed Consent: Informed consent signed with the Patient and all parties understand the risks and agree with anesthesia plan.  All questions " answered. Patient consented to blood products? No  ASA Score: 3  Day of Surgery Review of History & Physical: H&P Update referred to the surgeon/provider.    Ready For Surgery From Anesthesia Perspective.     .

## 2025-02-06 ENCOUNTER — ANESTHESIA (OUTPATIENT)
Dept: ENDOSCOPY | Facility: HOSPITAL | Age: 65
End: 2025-02-06
Payer: COMMERCIAL

## 2025-02-06 ENCOUNTER — HOSPITAL ENCOUNTER (OUTPATIENT)
Facility: HOSPITAL | Age: 65
Discharge: HOME | End: 2025-02-06
Attending: INTERNAL MEDICINE | Admitting: INTERNAL MEDICINE
Payer: COMMERCIAL

## 2025-02-06 DIAGNOSIS — Z86.0100 HX OF COLONIC POLYPS: ICD-10-CM

## 2025-02-06 PROCEDURE — 37000008 HC ANESTHESIA 1ST 15 MINUTES: Performed by: INTERNAL MEDICINE

## 2025-02-06 PROCEDURE — 45380 COLONOSCOPY AND BIOPSY: CPT | Mod: 33 | Performed by: INTERNAL MEDICINE

## 2025-02-06 PROCEDURE — 63600175 PHARM REV CODE 636 W HCPCS: Performed by: NURSE ANESTHETIST, CERTIFIED REGISTERED

## 2025-02-06 PROCEDURE — 37000009 HC ANESTHESIA EA ADD 15 MINS: Performed by: INTERNAL MEDICINE

## 2025-02-06 PROCEDURE — 45380 COLONOSCOPY AND BIOPSY: CPT | Mod: 33,,, | Performed by: INTERNAL MEDICINE

## 2025-02-06 PROCEDURE — 88305 TISSUE EXAM BY PATHOLOGIST: CPT | Mod: TC | Performed by: INTERNAL MEDICINE

## 2025-02-06 PROCEDURE — 25000003 PHARM REV CODE 250: Performed by: INTERNAL MEDICINE

## 2025-02-06 PROCEDURE — 27201423 OPTIME MED/SURG SUP & DEVICES STERILE SUPPLY: Performed by: INTERNAL MEDICINE

## 2025-02-06 RX ORDER — DEXTROMETHORPHAN/PSEUDOEPHED 2.5-7.5/.8
DROPS ORAL
Status: DISCONTINUED | OUTPATIENT
Start: 2025-02-06 | End: 2025-02-06 | Stop reason: HOSPADM

## 2025-02-06 RX ORDER — LIDOCAINE HYDROCHLORIDE 20 MG/ML
INJECTION INTRAVENOUS
Status: DISCONTINUED | OUTPATIENT
Start: 2025-02-06 | End: 2025-02-06

## 2025-02-06 RX ORDER — PROPOFOL 10 MG/ML
INJECTION, EMULSION INTRAVENOUS
Status: DISCONTINUED | OUTPATIENT
Start: 2025-02-06 | End: 2025-02-06

## 2025-02-06 RX ADMIN — PROPOFOL 50 MG: 10 INJECTION, EMULSION INTRAVENOUS at 07:02

## 2025-02-06 RX ADMIN — PROPOFOL 25 MG: 10 INJECTION, EMULSION INTRAVENOUS at 07:02

## 2025-02-06 RX ADMIN — LIDOCAINE HYDROCHLORIDE 50 MG: 20 INJECTION INTRAVENOUS at 07:02

## 2025-02-06 RX ADMIN — PROPOFOL 25 MG: 10 INJECTION, EMULSION INTRAVENOUS at 08:02

## 2025-02-06 RX ADMIN — PROPOFOL 75 MG: 10 INJECTION, EMULSION INTRAVENOUS at 07:02

## 2025-02-06 NOTE — ANESTHESIA POSTPROCEDURE EVALUATION
Anesthesia Post Evaluation    Patient: Aslhey Ruano    Procedure(s) Performed: Procedure(s) (LRB):  COLONOSCOPY, WITH POLYPECTOMY USING cold  BIOPSY FORCEPS (N/A)    Final Anesthesia Type: general      Patient location during evaluation: GI PACU  Patient participation: Yes- Able to Participate  Level of consciousness: awake and alert  Post-procedure vital signs: reviewed and stable  Pain management: adequate  Airway patency: patent    PONV status at discharge: No PONV  Anesthetic complications: no      Cardiovascular status: hemodynamically stable  Respiratory status: unassisted, spontaneous ventilation and room air  Hydration status: euvolemic  Follow-up not needed.              Vitals Value Taken Time   /72 02/06/25 0650   Temp 36.8 °C (98.2 °F) 02/06/25 0650   Pulse 68 02/06/25 0650   Resp 68 02/06/25 0650   SpO2 100 % 02/06/25 0740         No case tracking events are documented in the log.      Pain/Virgil Score: No data recorded

## 2025-02-06 NOTE — PROVATION PATIENT INSTRUCTIONS
Discharge Summary/Instructions after an Endoscopic Procedure  Patient Name: Ashley Ruano  Patient MRN: [id number]  Patient YOB: 1960 Thursday, February 6, 2025  Han Peters MD  Dear patient,  As a result of recent federal legislation (The Federal Cures Act), you may   receive lab or pathology results from your procedure in your [facility]   account before your physician is able to contact you. Your physician or   their representative will relay the results to you with their   recommendations at their soonest availability.  Thank you,  RESTRICTIONS:  During your procedure today, you received medications for sedation.  These   medications may affect your judgment, balance and coordination.  Therefore,   for 24 hours, you have the following restrictions:   - DO NOT drive a car, operate machinery, make legal/financial decisions,   sign important papers or drink alcohol.    ACTIVITY:  Today: no heavy lifting, straining or running due to procedural   sedation/anesthesia.  The following day: return to full activity including work.  DIET:  Eat and drink normally unless instructed otherwise.     TREATMENT FOR COMMON SIDE EFFECTS:  - Mild abdominal pain, nausea, belching, bloating or excessive gas:  rest,   eat lightly and use a heating pad.  - Sore Throat: treat with throat lozenges and/or gargle with warm salt   water.  - Because air was used during the procedure, expelling large amounts of air   from your rectum or belching is normal.  - If a bowel prep was taken, you may not have a bowel movement for 1-3 days.    This is normal.  SYMPTOMS TO WATCH FOR AND REPORT TO YOUR PHYSICIAN:  1. Abdominal pain or bloating, other than gas cramps.  2. Chest pain.  3. Back pain.  4. Signs of infection such as: chills or fever occurring within 24 hours   after the procedure.  5. Rectal bleeding, which would show as bright red, maroon, or black stools.   (A tablespoon of blood from the rectum is not serious, especially  if   hemorrhoids are present.)  6. Vomiting.  7. Weakness or dizziness.  GO DIRECTLY TO THE NEAREST EMERGENCY ROOM IF YOU HAVE ANY OF THE FOLLOWING:      Difficulty breathing              Chills and/or fever over 101 F   Persistent vomiting and/or vomiting blood   Severe abdominal pain   Severe chest pain   Black, tarry stools   Bleeding- more than one tablespoon   Any other symptom or condition that you feel may need urgent attention  Your doctor recommends these additional instructions:  If any biopsies were taken, your doctors clinic will contact you in 1 to 2   weeks with any results.  Recommendations:  - Discharge patient to home (ambulatory).   - Resume previous diet today.   - Repeat colonoscopy in 5 years for surveillance.   - Continue present medications.   - Patient has a contact number available for emergencies.  The signs and   symptoms of potential delayed complications were discussed with the   patient.  Return to normal activities tomorrow.  Written discharge   instructions were provided to the patient.  Impressions:  - Diverticulosis in the sigmoid colon and in the descending colon.   - Two 2 to 3 mm polyps in the rectum, removed with a cold biopsy forceps.    Resected and retrieved.   - The examination was otherwise normal.  For questions, problems or results please call your physician - Han Peters MD at Work:  [phone].  [facility] , EMERGENCY ROOM PHONE NUMBER: [phone]  IF A COMPLICATION OR EMERGENCY SITUATION ARISES AND YOU ARE UNABLE TO REACH   YOUR PHYSICIAN - GO DIRECTLY TO THE EMERGENCY ROOM.  MD Han Renae MD  2/6/2025 8:26:05 AM  This report has been verified and signed electronically.  Dear patient,  As a result of recent federal legislation (The Federal Cures Act), you may   receive lab or pathology results from your procedure in your [facility]   account before your physician is able to contact you. Your physician or   their  representative will relay the results to you with their   recommendations at their soonest availability.  Thank you,  PROVATION

## 2025-02-06 NOTE — TRANSFER OF CARE
Anesthesia Transfer of Care Note    Patient: Ashley Ruano    Procedure(s) Performed: Procedure(s) (LRB):  COLONOSCOPY, WITH POLYPECTOMY USING cold  BIOPSY FORCEPS (N/A)    Patient location: GI    Anesthesia Type: general    Post pain: adequate analgesia    Post assessment: no apparent anesthetic complications    Post vital signs: stable    Level of consciousness: awake    Nausea/Vomiting: no nausea/vomiting    Complications: none    Transfer of care protocol was followedComments: Report to Mireille JACKSON      Last vitals: p51 r 15 sat 100 fm t 36 92/67

## 2025-02-06 NOTE — PLAN OF CARE
Pt to room after procedure. Awake alert oriented. Able to tolerate liquids. Vitals stable. Able to dress self. Spoke with MD. Discharge instructions discussed. Verbalized understanding. Ready for discharge

## 2025-02-06 NOTE — H&P
Colonoscopy History and Physical    Patient Name: Ashley Ruano  MRN: [id number]  : 1960  Date of Procedure:  2025  Referring Physician: Han Peters MD  Primary Physician: Miranda Kaur NP  Procedure Physician: Han Peters MD    Procedure - Colonoscopy  ASA - per anesthesia  Mallampati - per anesthesia  History of Anesthesia problems - no  Family history Anesthesia problems -  no   Plan of anesthesia - General    Diagnosis: previous adenomatous polyp  Chief Complaint: Same as above    HPI: Patient is an 64 y.o. female is here for the above.  The patient's grandmother had colon cancer and as a result she has had screening colonoscopies every 5 years, her last colonoscopy apparently revealed several small benign colon polyps.  She has had no GI symptoms in the interim, her appetite is good and her weight is stable.  Her bowels function normally with no constipation or diarrhea.  She has never experienced any overt rectal bleeding.    Last colonoscopy:  5 years ago   Family history:  Grand Mother with colon cancer  Anticoagulation:  No    ROS:  Constitutional: No fevers, chills, No weight loss  CV: No chest pain  Pulm: No cough, No shortness of breath  GI: see HPI    Medical History:   Past Medical History:   Diagnosis Date    Chronic pain     foot pain    Personal history of colonic polyps 2020    Dr Yao Diaz         Surgical History:   Past Surgical History:   Procedure Laterality Date     SECTION      CHOLECYSTECTOMY      COLONOSCOPY  2020    Dr. Yao Diaz    DILATION AND CURETTAGE OF UTERUS      HYSTERECTOMY      INSERTION, STENT, ARTERY, FEMOROPOPLITEAL  2021    TONSILLECTOMY         Family History:   Family History   Problem Relation Name Age of Onset    Hypertension Mother      Deafness Mother      Blindness Mother      Alzheimer's disease Mother      Heart disease Father      Diabetes Father      Colon cancer Maternal Grandmother      .    Social History:   Social History     Socioeconomic History    Marital status:     Number of children: 3   Occupational History    Occupation:      Comment: [employer]     Comment: Works at [employer]- health works   Tobacco Use    Smoking status: Never    Smokeless tobacco: Never   Substance and Sexual Activity    Alcohol use: Not Currently    Drug use: Never    Sexual activity: Yes     Partners: Male     Social Drivers of Health     Physical Activity: Inactive (9/19/2023)    Exercise Vital Sign     Days of Exercise per Week: 0 days     Minutes of Exercise per Session: 0 min       Review of patient's allergies indicates:   Allergen Reactions    Grass pollen-june grass standard Itching    Mayonnaise Nausea And Vomiting       Medications:   Medications Prior to Admission   Medication Sig Dispense Refill Last Dose/Taking    polyethylene glycol (MOVIPREP) 100-7.5-2.691 gram solution Take as directed prior to colonoscopy 1 kit 0 2/6/2025 Morning    pregabalin (LYRICA) 150 MG capsule Take 150 mg by mouth once daily.   2/5/2025    acetaminophen (TYLENOL) 500 MG tablet Take 500 mg by mouth every 6 (six) hours as needed for Pain. (Patient not taking: Reported on 1/24/2025)   Not Taking    clotrimazole-betamethasone 1-0.05% (LOTRISONE) cream Apply topically 2 (two) times daily. (Patient not taking: Reported on 1/24/2025)   Not Taking    gabapentin (NEURONTIN) 300 MG capsule Take 1 capsule (300 mg total) by mouth every evening. (Patient not taking: Reported on 2/6/2025) 90 capsule 3 Not Taking    meloxicam (MOBIC) 7.5 MG tablet TAKE 1 TABLET BY MOUTH DAILY WITH FOOD AS NEEDED FOR PAIN **AVOID OTHER NSAIDS** (Patient not taking: Reported on 1/24/2025) 30 tablet 11 Not Taking    venlafaxine (EFFEXOR-XR) 75 MG 24 hr capsule Take 1 capsule (75 mg total) by mouth once daily. (Patient not taking: Reported on 1/24/2025) 30 capsule 11 Not Taking         Physical Exam:    Vital Signs:   Vitals:    02/06/25  "0650   BP: (!) 157/72   Pulse: 68   Resp: (!) 68   Temp: 98.2 °F (36.8 °C)     BP (!) 157/72 (BP Location: Left arm, Patient Position: Lying)   Pulse 68   Temp 98.2 °F (36.8 °C) (Oral)   Resp (!) 68   Ht 5' 3" (1.6 m)   Wt 74.4 kg (164 lb)   LMP  (LMP Unknown)   SpO2 98%   BMI 29.05 kg/m²     General:          Well appearing in no acute distress  Lungs: Clear to auscultation bilaterally, respirations unlabored  Heart: Regular rate and rhythm, S1 and S2 normal, no obvious murmurs  Abdomen:         Soft, non-tender, bowel sounds normal, no masses, no organomegaly        Labs:  Lab Results   Component Value Date    WBC 6.8 09/13/2022    HGB 13.2 09/13/2022    HCT 40.7 09/13/2022    MCV 91.5 09/13/2022     09/13/2022     No results found for: "INR", "PT", "APTT"  Lab Results   Component Value Date     09/13/2022    K 4.1 09/13/2022    CO2 28 09/13/2022    BUN 13.9 09/13/2022    CREATININE 0.59 09/13/2022    LABPROT 7.6 09/13/2022    ALBUMIN 4.1 09/13/2022    BILITOT 0.8 09/13/2022    ALKPHOS 89 09/13/2022    ALT 15 09/13/2022    AST 16 09/13/2022         Assessment and Plan:    History reviewed, vital signs satisfactory, cardiopulmonary status satisfactory.  I have explained the sedation options, risks, benefits, and alternatives of this endoscopic procedure to the patient including but not limited to bleeding, inflammation, infection, perforation, and death.  All questions were answered and the patient consented to proceed with procedure as planned.   The patient is deemed an appropriate candidate for the sedation as planned.      Han Peters MD   of Clinical Medicine  Gastroenterology and Hepatology  [facility]    2/6/2025  7:31 AM     "

## 2025-02-07 ENCOUNTER — TELEPHONE (OUTPATIENT)
Dept: INTERNAL MEDICINE | Facility: CLINIC | Age: 65
End: 2025-02-07
Payer: COMMERCIAL

## 2025-02-07 VITALS
HEART RATE: 53 BPM | WEIGHT: 164 LBS | DIASTOLIC BLOOD PRESSURE: 67 MMHG | RESPIRATION RATE: 18 BRPM | SYSTOLIC BLOOD PRESSURE: 135 MMHG | HEIGHT: 63 IN | TEMPERATURE: 98 F | OXYGEN SATURATION: 100 % | BODY MASS INDEX: 29.06 KG/M2

## 2025-02-07 LAB
ESTROGEN SERPL-MCNC: NORMAL PG/ML
INSULIN SERPL-ACNC: NORMAL U[IU]/ML
LAB AP CLINICAL INFORMATION: NORMAL
LAB AP GROSS DESCRIPTION: NORMAL
LAB AP REPORT FOOTNOTES: NORMAL
T3RU NFR SERPL: NORMAL %

## 2025-02-07 NOTE — TELEPHONE ENCOUNTER
----- Message from [employer] sent at 2/7/2025  9:18 AM CST -----  Who Called: Ashley Ruano    Caller is requesting to schedule their annual mammogram appointment. Order is not listed in Epic. Please enter order and contact patient to schedule.    Where would they like the mammogram performed? [facility]    Preferred Method of Contact: Phone Call  Patient's Preferred Phone Number on File: [phone]   Best Call Back Number, if different:  Additional Information: mammogram request, please place orders in epic, thanks

## 2025-02-07 NOTE — TELEPHONE ENCOUNTER
I spoke with patient and informed her that PCP will place order for MMG during her wellness visit 3/6/25. Pt. Verbalized understanding.

## 2025-02-10 ENCOUNTER — TELEPHONE (OUTPATIENT)
Dept: INTERNAL MEDICINE | Facility: CLINIC | Age: 65
End: 2025-02-10
Payer: COMMERCIAL

## 2025-02-10 DIAGNOSIS — E78.2 MIXED HYPERLIPIDEMIA: Primary | ICD-10-CM

## 2025-02-10 DIAGNOSIS — I10 PRIMARY HYPERTENSION: ICD-10-CM

## 2025-02-10 DIAGNOSIS — Z00.00 WELLNESS EXAMINATION: ICD-10-CM

## 2025-02-10 DIAGNOSIS — M06.9 RHEUMATOID ARTHRITIS, INVOLVING UNSPECIFIED SITE, UNSPECIFIED WHETHER RHEUMATOID FACTOR PRESENT: ICD-10-CM

## 2025-02-10 DIAGNOSIS — Z78.0 POSTMENOPAUSE: ICD-10-CM

## 2025-02-10 NOTE — TELEPHONE ENCOUNTER
----- Message from Nurse Ewing sent at 2/7/2025 10:37 AM CST -----    ----- Message -----  From: Maine Sylvester  Sent: 2/7/2025   9:21 AM CST  To: Natasha MÁRQUEZ Staff    Who Called: Ashley Ruano    What order is the patient requesting: labs   When does the expect the orders to be performed? 2/2825 going to [facility] for lab drawing    Preferred Method of Contact: Phone Call  Patient's Preferred Phone Number on File: [phone]   Best Call Back Number, if different:  Additional Information: order request, please place lab orders in [facility], thanks

## 2025-02-10 NOTE — TELEPHONE ENCOUNTER
Attempted to contact pt to inform her fasting lab orders were placed for her to complete one week prior to her upcoming appt on 3/6/25. Pt did not answer and has no voicemail setup . Unable to to LVM to return call to [facility].

## 2025-02-13 ENCOUNTER — TELEPHONE (OUTPATIENT)
Dept: ENDOSCOPY | Facility: HOSPITAL | Age: 65
End: 2025-02-13
Payer: COMMERCIAL

## 2025-02-13 NOTE — DISCHARGE SUMMARY
"[facility]  Discharge Note  Short Stay    Procedure(s) (LRB):  COLONOSCOPY, WITH POLYPECTOMY USING cold  BIOPSY FORCEPS (N/A)      OUTCOME: {OUTCOME:15003::"Patient tolerated treatment/procedure well without complication and is now ready for discharge."}    DISPOSITION: {Disposition:038898389::"Home or Self Care"}    FINAL DIAGNOSIS:  <principal problem not specified>    FOLLOWUP: {FOLLOWUP:15013::"In clinic"}    DISCHARGE INSTRUCTIONS:  No discharge procedures on file.      Clinical Reference Documents Added to Patient Instructions         Document    COLONOSCOPY DISCHARGE INSTRUCTIONS (ENGLISH)            TIME SPENT ON DISCHARGE: *** minutes  "

## 2025-02-13 NOTE — PROGRESS NOTES
Please let patient know that polyps removed were adenoma polyps.   These types of polyps are not cancer, but they are pre-cancerous (meaning that they can turn into cancers). Removing the polyp removes the risk of it becoming cancer.  Repeat colonoscopy is recommended in 5 years.  Patient has a history of adenomatous colon polyps.

## 2025-02-13 NOTE — TELEPHONE ENCOUNTER
----- Message from Han Peters MD sent at 2/13/2025  7:13 AM CST -----  Please let patient know that polyps removed were adenoma polyps.   These types of polyps are not cancer, but they are pre-cancerous (meaning that they can turn into cancers). Removing the polyp removes the risk of it becoming cancer.  Repeat colonoscopy is recommended in 5 years.  Patient has a history of adenomatous colon polyps.

## 2025-03-03 ENCOUNTER — LAB VISIT (OUTPATIENT)
Dept: LAB | Facility: HOSPITAL | Age: 65
End: 2025-03-03
Attending: NURSE PRACTITIONER
Payer: COMMERCIAL

## 2025-03-03 ENCOUNTER — RESULTS FOLLOW-UP (OUTPATIENT)
Dept: INTERNAL MEDICINE | Facility: CLINIC | Age: 65
End: 2025-03-03
Payer: COMMERCIAL

## 2025-03-03 DIAGNOSIS — M06.9 RHEUMATOID ARTHRITIS, INVOLVING UNSPECIFIED SITE, UNSPECIFIED WHETHER RHEUMATOID FACTOR PRESENT: ICD-10-CM

## 2025-03-03 DIAGNOSIS — I10 PRIMARY HYPERTENSION: ICD-10-CM

## 2025-03-03 DIAGNOSIS — Z00.00 WELLNESS EXAMINATION: ICD-10-CM

## 2025-03-03 DIAGNOSIS — E78.2 MIXED HYPERLIPIDEMIA: ICD-10-CM

## 2025-03-03 DIAGNOSIS — Z78.0 POSTMENOPAUSE: ICD-10-CM

## 2025-03-03 LAB
25(OH)D3+25(OH)D2 SERPL-MCNC: 33 NG/ML (ref 30–80)
ALBUMIN SERPL-MCNC: 3.9 G/DL (ref 3.4–4.8)
ALBUMIN/GLOB SERPL: 1.1 RATIO (ref 1.1–2)
ALP SERPL-CCNC: 78 UNIT/L (ref 40–150)
ALT SERPL-CCNC: 18 UNIT/L (ref 0–55)
ANION GAP SERPL CALC-SCNC: 6 MEQ/L
AST SERPL-CCNC: 20 UNIT/L (ref 5–34)
BACTERIA #/AREA URNS AUTO: ABNORMAL /HPF
BASOPHILS # BLD AUTO: 0.02 X10(3)/MCL
BASOPHILS NFR BLD AUTO: 0.3 %
BILIRUB SERPL-MCNC: 0.7 MG/DL
BILIRUB UR QL STRIP.AUTO: NEGATIVE
BUN SERPL-MCNC: 15 MG/DL (ref 9.8–20.1)
CALCIUM SERPL-MCNC: 9.4 MG/DL (ref 8.4–10.2)
CHLORIDE SERPL-SCNC: 109 MMOL/L (ref 98–107)
CHOLEST SERPL-MCNC: 214 MG/DL
CHOLEST/HDLC SERPL: 4 {RATIO} (ref 0–5)
CLARITY UR: ABNORMAL
CO2 SERPL-SCNC: 28 MMOL/L (ref 23–31)
COLOR UR AUTO: YELLOW
CREAT SERPL-MCNC: 0.66 MG/DL (ref 0.55–1.02)
CREAT UR-MCNC: 201 MG/DL (ref 45–106)
CREAT/UREA NIT SERPL: 23
EOSINOPHIL # BLD AUTO: 0.18 X10(3)/MCL (ref 0–0.9)
EOSINOPHIL NFR BLD AUTO: 3 %
ERYTHROCYTE [DISTWIDTH] IN BLOOD BY AUTOMATED COUNT: 12.3 % (ref 11.5–17)
EST. AVERAGE GLUCOSE BLD GHB EST-MCNC: 99.7 MG/DL
GFR SERPLBLD CREATININE-BSD FMLA CKD-EPI: >60 ML/MIN/1.73/M2
GLOBULIN SER-MCNC: 3.4 GM/DL (ref 2.4–3.5)
GLUCOSE SERPL-MCNC: 98 MG/DL (ref 82–115)
GLUCOSE UR QL STRIP: NORMAL
HBA1C MFR BLD: 5.1 %
HCT VFR BLD AUTO: 39.1 % (ref 37–47)
HDLC SERPL-MCNC: 60 MG/DL (ref 35–60)
HGB BLD-MCNC: 12.5 G/DL (ref 12–16)
HGB UR QL STRIP: ABNORMAL
HYALINE CASTS #/AREA URNS LPF: ABNORMAL /LPF
IMM GRANULOCYTES # BLD AUTO: 0.01 X10(3)/MCL (ref 0–0.04)
IMM GRANULOCYTES NFR BLD AUTO: 0.2 %
KETONES UR QL STRIP: NEGATIVE
LDLC SERPL CALC-MCNC: 135 MG/DL (ref 50–140)
LEUKOCYTE ESTERASE UR QL STRIP: 500
LYMPHOCYTES # BLD AUTO: 1.8 X10(3)/MCL (ref 0.6–4.6)
LYMPHOCYTES NFR BLD AUTO: 30.1 %
MCH RBC QN AUTO: 29.8 PG (ref 27–31)
MCHC RBC AUTO-ENTMCNC: 32 G/DL (ref 33–36)
MCV RBC AUTO: 93.3 FL (ref 80–94)
MICROALBUMIN UR-MCNC: 14.7 UG/ML
MICROALBUMIN/CREAT RATIO PNL UR: 7.3 MG/GM CR (ref 0–30)
MONOCYTES # BLD AUTO: 0.49 X10(3)/MCL (ref 0.1–1.3)
MONOCYTES NFR BLD AUTO: 8.2 %
MUCOUS THREADS URNS QL MICRO: ABNORMAL /LPF
NEUTROPHILS # BLD AUTO: 3.49 X10(3)/MCL (ref 2.1–9.2)
NEUTROPHILS NFR BLD AUTO: 58.2 %
NITRITE UR QL STRIP: NEGATIVE
NRBC BLD AUTO-RTO: 0 %
PH UR STRIP: 6 [PH]
PLATELET # BLD AUTO: 204 X10(3)/MCL (ref 130–400)
PMV BLD AUTO: 10.9 FL (ref 7.4–10.4)
POTASSIUM SERPL-SCNC: 4.6 MMOL/L (ref 3.5–5.1)
PROT SERPL-MCNC: 7.3 GM/DL (ref 5.8–7.6)
PROT UR QL STRIP: ABNORMAL
RBC # BLD AUTO: 4.19 X10(6)/MCL (ref 4.2–5.4)
RBC #/AREA URNS AUTO: ABNORMAL /HPF
SODIUM SERPL-SCNC: 143 MMOL/L (ref 136–145)
SP GR UR STRIP.AUTO: 1.03 (ref 1–1.03)
SQUAMOUS #/AREA URNS LPF: ABNORMAL /HPF
TRIGL SERPL-MCNC: 93 MG/DL (ref 37–140)
TSH SERPL-ACNC: 1.25 UIU/ML (ref 0.35–4.94)
UROBILINOGEN UR STRIP-ACNC: NORMAL
VLDLC SERPL CALC-MCNC: 19 MG/DL
WBC # BLD AUTO: 5.99 X10(3)/MCL (ref 4.5–11.5)
WBC #/AREA URNS AUTO: ABNORMAL /HPF

## 2025-03-03 PROCEDURE — 84443 ASSAY THYROID STIM HORMONE: CPT

## 2025-03-03 PROCEDURE — 36415 COLL VENOUS BLD VENIPUNCTURE: CPT

## 2025-03-03 PROCEDURE — 80053 COMPREHEN METABOLIC PANEL: CPT

## 2025-03-03 PROCEDURE — 80061 LIPID PANEL: CPT

## 2025-03-03 PROCEDURE — 82043 UR ALBUMIN QUANTITATIVE: CPT

## 2025-03-03 PROCEDURE — 85025 COMPLETE CBC W/AUTO DIFF WBC: CPT

## 2025-03-03 PROCEDURE — 81001 URINALYSIS AUTO W/SCOPE: CPT

## 2025-03-03 PROCEDURE — 83036 HEMOGLOBIN GLYCOSYLATED A1C: CPT

## 2025-03-03 PROCEDURE — 82306 VITAMIN D 25 HYDROXY: CPT

## 2025-03-03 NOTE — PROGRESS NOTES
Please contact pt and let her know some bacteria noted in urine. Please ask patient if experiencing any urinary symptoms such as burning with urination, frequency, urgency, lower abdominal pain/ pressure, n/v, fever, chills, weakness? If so, will need to send antibiotic.

## 2025-03-03 NOTE — TELEPHONE ENCOUNTER
----- Message from Miranda Kaur NP sent at 3/3/2025  2:04 PM CST -----  Please contact pt and let her know some bacteria noted in urine. Please ask patient if experiencing any urinary symptoms such as burning with urination, frequency, urgency, lower abdominal pain/   pressure, n/v, fever, chills, weakness? If so, will need to send antibiotic.   ----- Message -----  From: Lab, Background User  Sent: 3/3/2025   6:44 AM CST  To: Miranda Kaur NP

## 2025-03-06 ENCOUNTER — HOSPITAL ENCOUNTER (OUTPATIENT)
Dept: RADIOLOGY | Facility: HOSPITAL | Age: 65
Discharge: HOME | End: 2025-03-06
Attending: NURSE PRACTITIONER
Payer: COMMERCIAL

## 2025-03-06 ENCOUNTER — OFFICE VISIT (OUTPATIENT)
Dept: INTERNAL MEDICINE | Facility: CLINIC | Age: 65
End: 2025-03-06
Payer: COMMERCIAL

## 2025-03-06 VITALS
OXYGEN SATURATION: 97 % | HEART RATE: 64 BPM | HEIGHT: 63 IN | BODY MASS INDEX: 29.75 KG/M2 | DIASTOLIC BLOOD PRESSURE: 72 MMHG | RESPIRATION RATE: 18 BRPM | WEIGHT: 167.88 LBS | SYSTOLIC BLOOD PRESSURE: 125 MMHG | TEMPERATURE: 99 F

## 2025-03-06 DIAGNOSIS — G62.9 POLYNEUROPATHY: ICD-10-CM

## 2025-03-06 DIAGNOSIS — M51.369 BULGING LUMBAR DISC: ICD-10-CM

## 2025-03-06 DIAGNOSIS — M85.80 OSTEOPENIA, UNSPECIFIED LOCATION: ICD-10-CM

## 2025-03-06 DIAGNOSIS — Z00.00 WELLNESS EXAMINATION: Primary | ICD-10-CM

## 2025-03-06 DIAGNOSIS — M79.642 BILATERAL HAND PAIN: ICD-10-CM

## 2025-03-06 DIAGNOSIS — Z12.31 VISIT FOR SCREENING MAMMOGRAM: ICD-10-CM

## 2025-03-06 DIAGNOSIS — S32.000A COMPRESSION FRACTURE OF LUMBAR VERTEBRA, UNSPECIFIED LUMBAR VERTEBRAL LEVEL, INITIAL ENCOUNTER: ICD-10-CM

## 2025-03-06 DIAGNOSIS — M79.641 BILATERAL HAND PAIN: ICD-10-CM

## 2025-03-06 DIAGNOSIS — M79.671 CHRONIC HEEL PAIN, RIGHT: ICD-10-CM

## 2025-03-06 DIAGNOSIS — G89.29 CHRONIC HEEL PAIN, RIGHT: ICD-10-CM

## 2025-03-06 DIAGNOSIS — M06.9 RHEUMATOID ARTHRITIS, INVOLVING UNSPECIFIED SITE, UNSPECIFIED WHETHER RHEUMATOID FACTOR PRESENT: ICD-10-CM

## 2025-03-06 DIAGNOSIS — Z78.0 POSTMENOPAUSE: ICD-10-CM

## 2025-03-06 DIAGNOSIS — M25.50 GENERALIZED JOINT PAIN: ICD-10-CM

## 2025-03-06 DIAGNOSIS — Z23 NEEDS FLU SHOT: ICD-10-CM

## 2025-03-06 DIAGNOSIS — R20.2 PARESTHESIAS: ICD-10-CM

## 2025-03-06 DIAGNOSIS — S22.000A COMPRESSION FRACTURE OF BODY OF THORACIC VERTEBRA: ICD-10-CM

## 2025-03-06 DIAGNOSIS — M48.061 FORAMINAL STENOSIS OF LUMBAR REGION: ICD-10-CM

## 2025-03-06 DIAGNOSIS — M51.362 DEGENERATION OF INTERVERTEBRAL DISC OF LUMBAR REGION WITH DISCOGENIC BACK PAIN AND LOWER EXTREMITY PAIN: ICD-10-CM

## 2025-03-06 PROCEDURE — 90471 IMMUNIZATION ADMIN: CPT | Mod: PBBFAC

## 2025-03-06 PROCEDURE — 99215 OFFICE O/P EST HI 40 MIN: CPT | Mod: PBBFAC,25 | Performed by: NURSE PRACTITIONER

## 2025-03-06 PROCEDURE — 3061F NEG MICROALBUMINURIA REV: CPT | Mod: CPTII,,, | Performed by: NURSE PRACTITIONER

## 2025-03-06 PROCEDURE — 90656 IIV3 VACC NO PRSV 0.5 ML IM: CPT | Mod: PBBFAC

## 2025-03-06 PROCEDURE — 3074F SYST BP LT 130 MM HG: CPT | Mod: CPTII,,, | Performed by: NURSE PRACTITIONER

## 2025-03-06 PROCEDURE — 3078F DIAST BP <80 MM HG: CPT | Mod: CPTII,,, | Performed by: NURSE PRACTITIONER

## 2025-03-06 PROCEDURE — 1160F RVW MEDS BY RX/DR IN RCRD: CPT | Mod: CPTII,,, | Performed by: NURSE PRACTITIONER

## 2025-03-06 PROCEDURE — 3066F NEPHROPATHY DOC TX: CPT | Mod: CPTII,,, | Performed by: NURSE PRACTITIONER

## 2025-03-06 PROCEDURE — 73130 X-RAY EXAM OF HAND: CPT | Mod: TC,LT

## 2025-03-06 PROCEDURE — 99214 OFFICE O/P EST MOD 30 MIN: CPT | Mod: 25,S$PBB,, | Performed by: NURSE PRACTITIONER

## 2025-03-06 PROCEDURE — 99396 PREV VISIT EST AGE 40-64: CPT | Mod: S$PBB,,, | Performed by: NURSE PRACTITIONER

## 2025-03-06 PROCEDURE — 3008F BODY MASS INDEX DOCD: CPT | Mod: CPTII,,, | Performed by: NURSE PRACTITIONER

## 2025-03-06 PROCEDURE — 73130 X-RAY EXAM OF HAND: CPT | Mod: TC,RT

## 2025-03-06 PROCEDURE — 1159F MED LIST DOCD IN RCRD: CPT | Mod: CPTII,,, | Performed by: NURSE PRACTITIONER

## 2025-03-06 PROCEDURE — 3044F HG A1C LEVEL LT 7.0%: CPT | Mod: CPTII,,, | Performed by: NURSE PRACTITIONER

## 2025-03-06 RX ADMIN — INFLUENZA VIRUS VACCINE 0.5 ML: 15; 15; 15 SUSPENSION INTRAMUSCULAR at 08:03

## 2025-03-06 NOTE — PROGRESS NOTES
Miranda L Natasha, NP   [facility]  [address] [facility] [address]      PATIENT NAME: Ashley Ruano  : 1960  DATE: 3/6/25  MRN: [id number]        History of Present Illness / Problem Focused Workflow     Ashley Ruano presents to the clinic with Follow-up and Foot Pain (States seeing Dr Myers ( foot)  c/o heel pain - states he want to order MRI of her feet.)     63 yo female for wellness visit. Last seen 23. PMh includes CTS, osteopenia, osteoarthritis, hot flashes, plantar fasciitis, OA, diverticulosis, colon polyps, venous reflux, and obesity. Labs completed 3/3/25; reviewed. Overall doing okay. Due for DEXA and MMG. Denies any fever, chills, HA, dizziness, CP, SOB, abdominal pain, n/v/d, bloody stools, hematuria or vaginal bleeding.    She continues with chronic generalized joint pain and neuropathy. She is c/o heel pain with upcoming MRI per Podiatrist. Had MRI lumbar spine completed. Previously was seen by Rheumatologist, Dr. Hylton, a few years ago but did not receive follow up and unable to provide confirmation of any diagnosis received.     Screenings:  MMG 2023  DEXA 2022 osteopenia femur   CRC 25 diverticulosis in sigmoid colon and descending colon, two 2-3 mm polyps in rectum; repeat 5 years   PAP    Other providers:   Dr. Lucia Hylton- Rheumatology   PT  [facility]    Review of Systems     Review of Systems   Constitutional: Negative.    HENT: Negative.     Eyes: Negative.    Respiratory: Negative.     Cardiovascular: Negative.    Gastrointestinal: Negative.    Endocrine: Negative.    Genitourinary: Negative.    Musculoskeletal:  Positive for arthralgias.   Skin: Negative.    Allergic/Immunologic: Negative.    Neurological:  Positive for numbness.   Hematological: Negative.    Psychiatric/Behavioral: Negative.         Medical / Social / Family History     -------------------------------------     "Chronic pain    foot pain    Personal history of colonic polyps    Dr Yao Diaz        Past Surgical History:   Procedure Laterality Date     SECTION      CHOLECYSTECTOMY      COLONOSCOPY  2020    Dr. Yao Diaz    COLONOSCOPY, WITH POLYPECTOMY USING HOT BIOPSY FORCEPS N/A 2025    Procedure: COLONOSCOPY, WITH POLYPECTOMY USING cold  BIOPSY FORCEPS;  Surgeon: Fran, Han RUELAS MD;  Location: [facility] ENDOSCOPY;  Service: Endoscopy;  Laterality: N/A;    DILATION AND CURETTAGE OF UTERUS      HYSTERECTOMY      INSERTION, STENT, ARTERY, FEMOROPOPLITEAL  2021    TONSILLECTOMY         Social History[1]     Family History   Problem Relation Name Age of Onset    Hypertension Mother      Deafness Mother      Blindness Mother      Alzheimer's disease Mother      Heart disease Father      Diabetes Father      Colon cancer Maternal Grandmother          Medications and Allergies     Medications  Current Outpatient Medications   Medication Instructions    acetaminophen (TYLENOL) 500 mg, Every 6 hours PRN    clotrimazole-betamethasone 1-0.05% (LOTRISONE) cream 2 times daily    gabapentin (NEURONTIN) 300 mg, Oral, Nightly    meloxicam (MOBIC) 7.5 MG tablet TAKE 1 TABLET BY MOUTH DAILY WITH FOOD AS NEEDED FOR PAIN **AVOID OTHER NSAIDS**    polyethylene glycol (MOVIPREP) 100-7.5-2.691 gram solution Take as directed prior to colonoscopy    pregabalin (LYRICA) 150 mg, Daily    venlafaxine (EFFEXOR-XR) 75 mg, Oral, Daily       Allergies  Review of patient's allergies indicates:   Allergen Reactions    Grass pollen- grass standard Itching    Mayonnaise Nausea And Vomiting       Physical Examination     Visit Vitals  /72 (BP Location: Right arm, Patient Position: Sitting)   Pulse 64   Temp 98.8 °F (37.1 °C) (Oral)   Resp 18   Ht 5' 2.99" (1.6 m)   Wt 76.2 kg (167 lb 14.4 oz)   LMP  (LMP Unknown)   SpO2 97%   BMI 29.75 kg/m²       Physical Exam  Vitals and nursing note reviewed. "   Constitutional:       General: She is not in acute distress.     Appearance: Normal appearance. She is not ill-appearing, toxic-appearing or diaphoretic.   HENT:      Head: Normocephalic.      Right Ear: Tympanic membrane, ear canal and external ear normal.      Left Ear: Tympanic membrane, ear canal and external ear normal.   Neck:      Thyroid: No thyroid mass, thyromegaly or thyroid tenderness.      Vascular: No carotid bruit.   Cardiovascular:      Rate and Rhythm: Normal rate and regular rhythm.      Pulses: Normal pulses.   Pulmonary:      Effort: Pulmonary effort is normal. No respiratory distress.      Breath sounds: Normal breath sounds.   Abdominal:      General: Bowel sounds are normal. There is no distension.      Palpations: Abdomen is soft. There is no mass.      Tenderness: There is no abdominal tenderness.      Hernia: No hernia is present.   Musculoskeletal:         General: Normal range of motion.      Right hand: Tenderness present.      Left hand: Tenderness present.      Cervical back: Neck supple.      Lumbar back: No tenderness.      Right lower leg: No edema.      Left lower leg: No edema.      Right foot: Tenderness present.   Lymphadenopathy:      Cervical: No cervical adenopathy.   Skin:     General: Skin is warm and dry.      Capillary Refill: Capillary refill takes less than 2 seconds.   Neurological:      Mental Status: She is alert and oriented to person, place, and time. Mental status is at baseline.      Motor: No weakness.      Coordination: Coordination normal.      Gait: Gait normal.   Psychiatric:         Mood and Affect: Mood normal.         Behavior: Behavior normal.         Thought Content: Thought content normal.         Judgment: Judgment normal.           Results     Lab Results   Component Value Date    WBC 5.99 03/03/2025    RBC 4.19 (L) 03/03/2025    HGB 12.5 03/03/2025    HCT 39.1 03/03/2025    MCV 93.3 03/03/2025    MCH 29.8 03/03/2025    MCHC 32.0 (L) 03/03/2025     RDW 12.3 03/03/2025     03/03/2025    MPV 10.9 (H) 03/03/2025     Sodium   Date Value Ref Range Status   03/03/2025 143 136 - 145 mmol/L Final     Potassium   Date Value Ref Range Status   03/03/2025 4.6 3.5 - 5.1 mmol/L Final     Chloride   Date Value Ref Range Status   03/03/2025 109 (H) 98 - 107 mmol/L Final     CO2   Date Value Ref Range Status   03/03/2025 28 23 - 31 mmol/L Final     Blood Urea Nitrogen   Date Value Ref Range Status   03/03/2025 15.0 9.8 - 20.1 mg/dL Final     Creatinine   Date Value Ref Range Status   03/03/2025 0.66 0.55 - 1.02 mg/dL Final     Calcium   Date Value Ref Range Status   03/03/2025 9.4 8.4 - 10.2 mg/dL Final     Albumin   Date Value Ref Range Status   03/03/2025 3.9 3.4 - 4.8 g/dL Final     Bilirubin Total   Date Value Ref Range Status   03/03/2025 0.7 <=1.5 mg/dL Final     ALP   Date Value Ref Range Status   03/03/2025 78 40 - 150 unit/L Final     AST   Date Value Ref Range Status   03/03/2025 20 5 - 34 unit/L Final     ALT   Date Value Ref Range Status   03/03/2025 18 0 - 55 unit/L Final     Estimated GFR-Non    Date Value Ref Range Status   09/21/2021 >105 >>=90 mL/min/1.73 m2 Final     Lab Results   Component Value Date    CHOL 214 (H) 03/03/2025     Lab Results   Component Value Date    HDL 60 03/03/2025     Lab Results   Component Value Date    TRIG 93 03/03/2025     Lab Results   Component Value Date    .00 03/03/2025     Lab Results   Component Value Date    TSH 1.247 03/03/2025     Lab Results   Component Value Date    PHUR 6.0 03/03/2025    PROTEINUA Trace (A) 03/03/2025    GLUCUA Normal 03/03/2025    KETONESU Negative 08/21/2020    OCCULTUA 1+ (A) 03/03/2025    NITRITE Negative 03/03/2025    LEUKOCYTESUR 500 (A) 03/03/2025     Lab Results   Component Value Date    HGBA1C 5.1 03/03/2025    HGBA1C 5.1 09/18/2023    HGBA1C 5.1 09/13/2022     Lab Results   Component Value Date    MICALBCREAT 7.3 03/03/2025        Assessment       ICD-10-CM  ICD-9-CM   1. Wellness examination  Z00.00 V70.0   2. Needs flu shot  Z23 V04.81   3. Visit for screening mammogram  Z12.31 V76.12   4. Chronic heel pain, right  M79.671 729.5    G89.29 338.29   5. Rheumatoid arthritis, involving unspecified site, unspecified whether rheumatoid factor present  M06.9 714.0   6. Polyneuropathy  G62.9 356.9   7. Degeneration of intervertebral disc of lumbar region with discogenic back pain and lower extremity pain  M51.362 722.52   8. Bulging lumbar disc  M51.369 722.52   9. Foraminal stenosis of lumbar region  M48.061 724.02   10. Compression fracture of body of thoracic vertebra  S22.000A 805.2   11. Compression fracture of lumbar vertebra, unspecified lumbar vertebral level, initial encounter  S32.000A 805.4   12. Generalized joint pain  M25.50 719.40   13. Osteopenia, unspecified location  M85.80 733.90   14. Bilateral hand pain  M79.641 729.5    M79.642    15. Paresthesias  R20.2 782.0   16. Postmenopause  Z78.0 V49.81       Plan       Problem List Items Addressed This Visit          Neuro    Bulging lumbar disc    Compression fracture of body of thoracic vertebra    Compression fracture of lumbar vertebra    Degeneration of intervertebral disc of lumbar region with discogenic back pain and lower extremity pain    Foraminal stenosis of lumbar region    Overview   9/23/24 MRI Lumbar spine with mild b/l foraminal stenosis          Current Assessment & Plan   Denies any back pain. Endorsing feet pain and numbness   EMG testing ordered   Pending Neurosurgery referral            Relevant Orders    Ambulatory referral/consult to Neurology    Polyneuropathy    Current Assessment & Plan   Pt with b/l hand pain and neuropathy to hands and b/l feet   Had MRI lumbar spine 9/2024  Will obtain EMG for further testing and complete b/l hand XR today    Previously had abnormal autoimmune work up and seen by rheumatologist; need to obtain records for review   Will repeat autoimmune testing due to  pt generalized joint pain/ neuropathy and family history of RA         Relevant Orders    Ambulatory referral/consult to Neurology    Sedimentation rate (Completed)    C-Reactive Protein (Completed)    CYCLIC CITRULLINATED PEPTIDE (CCP) ANTIBODY (Completed)    Uric Acid (Completed)    C4 Complement (Completed)    Rheumatoid Factor IgA (Completed)    Rheumatoid Factor IgM (Completed)    Rheumatoid Quantitative (Completed)       Immunology/Multi System    Rheumatoid arthritis    Current Assessment & Plan   Recheck blood work  Previously seen by Dr. Hylton x 1  ? No records for review  Family history   Pt endorses chronic generalized joint pain/ polyneuropathy            Orthopedic    Bilateral hand pain    Relevant Orders    X-Ray Hand Complete Right (Completed)    X-Ray Hand 3 view Left (Completed)    Chronic heel pain, right    Current Assessment & Plan   Pt to complete further testing with podiatrist          Generalized joint pain    Relevant Orders    Sedimentation rate (Completed)    C-Reactive Protein (Completed)    CYCLIC CITRULLINATED PEPTIDE (CCP) ANTIBODY (Completed)    Uric Acid (Completed)    C4 Complement (Completed)    Rheumatoid Factor IgA (Completed)    Rheumatoid Factor IgM (Completed)    Rheumatoid Quantitative (Completed)    Osteopenia    Current Assessment & Plan   Last 12/2022  Daily calcium and vitamin D supplementation: Calcium 1500 mg daily; high calcium foods include salmon or sardines with bones, low fat yogurt, skim milk, green vegetables, and cheese. Vitamin D 3 2000 IU once daily; high vitamin D foods include mushrooms, fish oil, cod liver, salmon, tuna, egg yolks, milk fortified with Vitamin D and foods fortified with Vitamin D such as cereals and oatmeal.  Decrease/ Avoid use of alcohol, tobacco use, caffeine.  Educated on health benefits of at least 5 days/ week of 30 minutes moderate intensity exercise (brisk walking) and 2 or more days/ week of muscle strength activities  Fall  precautions discussed such as removing scatter and clutter in home, use hand rails when walking up stairs, proper foot wear, and use of bright lights in home especially at night.            Relevant Orders    DXA Bone Density Axial Skeleton 1 or more sites     Other Visit Diagnoses         Wellness examination    -  Primary      Needs flu shot        Relevant Medications    influenza (Flulaval, Fluzone, Fluarix) 45 mcg/0.5 mL IM vaccine (> or = 6 mo) 0.5 mL (Completed)      Visit for screening mammogram        Relevant Orders    Mammo Digital Screening Bilat w/ Ilya (XPD)      Paresthesias        Relevant Orders    Ambulatory referral/consult to Neurology    Sedimentation rate (Completed)    C-Reactive Protein (Completed)    CYCLIC CITRULLINATED PEPTIDE (CCP) ANTIBODY (Completed)    Uric Acid (Completed)    C4 Complement (Completed)    Rheumatoid Factor IgA (Completed)    Rheumatoid Factor IgM (Completed)    Rheumatoid Quantitative (Completed)      Postmenopause        Relevant Orders    DXA Bone Density Axial Skeleton 1 or more sites            Future Appointments   Date Time Provider Department Center   3/28/2025  7:30 AM [facility] XR10 DEXA1 350 LB LIMIT [facility] XRAY [facility]   3/28/2025  8:00 AM UL MAMMO2 [facility] MAMMO [facility]   5/22/2025  9:15 AM Ivan Young MD [address]   6/12/2025  8:20 AM [facility]          Follow up in about 3 months (around 6/6/2025) for neuropathy and arthritis pain follow up .    Signature:  Miranda Kaur NP  [facility]  [address] [facility] [address]    Date of encounter: 3/6/25         [1]   Social History  Socioeconomic History    Marital status:     Number of children: 3   Occupational History    Occupation:      Comment: [employer]     Comment: Works at [employer]- health works   Tobacco Use    Smoking status: Never    Smokeless tobacco:  Never   Substance and Sexual Activity    Alcohol use: Not Currently    Drug use: Never    Sexual activity: Yes     Partners: Male     Social Drivers of Health     Physical Activity: Inactive (9/19/2023)    Exercise Vital Sign     Days of Exercise per Week: 0 days     Minutes of Exercise per Session: 0 min

## 2025-03-06 NOTE — PATIENT INSTRUCTIONS
You are being referred to Neurology provider at [facility]      You will be called for Mammogram and Bone density testing. Scheduling department [phone]

## 2025-03-07 NOTE — TELEPHONE ENCOUNTER
Contacted informed informed of having bacteria in urine. She denies having any symptoms (n/v, frequency, urgency, burning, low abd pain, fever). Encourage to contact the clinic if start having any symptoms.She voiced understanding.

## 2025-03-10 NOTE — ASSESSMENT & PLAN NOTE
Recheck blood work  Previously seen by Dr. Hylton x 1  ? No records for review  Family history   Pt endorses chronic generalized joint pain/ polyneuropathy

## 2025-03-10 NOTE — ASSESSMENT & PLAN NOTE
Pt with b/l hand pain and neuropathy to hands and b/l feet   Had MRI lumbar spine 9/2024  Will obtain EMG for further testing and complete b/l hand XR today    Previously had abnormal autoimmune work up and seen by rheumatologist; need to obtain records for review   Will repeat autoimmune testing due to pt generalized joint pain/ neuropathy and family history of RA

## 2025-03-10 NOTE — ASSESSMENT & PLAN NOTE
Denies any back pain. Endorsing feet pain and numbness   EMG testing ordered   Pending Neurosurgery referral

## 2025-03-10 NOTE — ASSESSMENT & PLAN NOTE
Last 12/2022  Daily calcium and vitamin D supplementation: Calcium 1500 mg daily; high calcium foods include salmon or sardines with bones, low fat yogurt, skim milk, green vegetables, and cheese. Vitamin D 3 2000 IU once daily; high vitamin D foods include mushrooms, fish oil, cod liver, salmon, tuna, egg yolks, milk fortified with Vitamin D and foods fortified with Vitamin D such as cereals and oatmeal.  Decrease/ Avoid use of alcohol, tobacco use, caffeine.  Educated on health benefits of at least 5 days/ week of 30 minutes moderate intensity exercise (brisk walking) and 2 or more days/ week of muscle strength activities  Fall precautions discussed such as removing scatter and clutter in home, use hand rails when walking up stairs, proper foot wear, and use of bright lights in home especially at night.

## 2025-03-17 ENCOUNTER — RESULTS FOLLOW-UP (OUTPATIENT)
Dept: INTERNAL MEDICINE | Facility: CLINIC | Age: 65
End: 2025-03-17
Payer: COMMERCIAL

## 2025-03-17 ENCOUNTER — TELEPHONE (OUTPATIENT)
Dept: INTERNAL MEDICINE | Facility: CLINIC | Age: 65
End: 2025-03-17
Payer: COMMERCIAL

## 2025-03-17 DIAGNOSIS — M19.042 PRIMARY OSTEOARTHRITIS OF BOTH HANDS: Primary | ICD-10-CM

## 2025-03-17 DIAGNOSIS — M19.041 PRIMARY OSTEOARTHRITIS OF BOTH HANDS: Primary | ICD-10-CM

## 2025-03-17 NOTE — TELEPHONE ENCOUNTER
Received call back informed of Mrs. Kaur  message below. She voiced understanding. Denies any injury / trauma.

## 2025-03-17 NOTE — PROGRESS NOTES
Please let patient know lab test results did not show any abnormal findings to suggest autoimmune arthritis (RA).   X-ray of b/l hands with degenerative changes (arthritis) noted and questionable fracture of thumb of right hand. Please ask if any recent injury/ fall/trauma? I did order referral to Orthopedic clinic at [facility]. She will receive a call to schedule appointment.

## 2025-03-17 NOTE — TELEPHONE ENCOUNTER
----- Message from Miranda Kaur NP sent at 3/17/2025  2:45 PM CDT -----  Please let patient know lab test results did not show any abnormal findings to suggest autoimmune arthritis (RA).   X-ray of b/l hands with degenerative changes (arthritis) noted and questionable fracture of thumb of right hand. Please ask if any recent injury/ fall/trauma? I did order referral to Orthopedic   clinic at [facility]. She will receive a call to schedule appointment.  ----- Message -----  From: Julianna Rad Results In  Sent: 3/6/2025   4:18 PM CDT  To: Miranda Kaur NP

## 2025-03-17 NOTE — TELEPHONE ENCOUNTER
Called no answer. No voicemail setup. Will call back.   October 20, 2022     Patient: Jessica Ocasio   YOB: 2008   Date of Visit: 10/20/2022       To Whom it May Concern:    Jessica Ocasio was seen in my clinic on 10/20/2022 at 8:40 am.     Please excuse Jessica for her absence from gym/sports until 10/25/22, she may resume gym/sports afterwards gradually as tolerated    Sincerely,         Eliane Reilly MD    Medical information is confidential and cannot be disclosed without the written consent of the patient or her representative.

## 2025-03-27 ENCOUNTER — OFFICE VISIT (OUTPATIENT)
Dept: ORTHOPEDICS | Facility: CLINIC | Age: 65
End: 2025-03-27
Payer: COMMERCIAL

## 2025-03-27 VITALS
HEART RATE: 71 BPM | BODY MASS INDEX: 30.36 KG/M2 | HEIGHT: 62 IN | DIASTOLIC BLOOD PRESSURE: 84 MMHG | WEIGHT: 165 LBS | SYSTOLIC BLOOD PRESSURE: 152 MMHG

## 2025-03-27 DIAGNOSIS — M18.0 PRIMARY OSTEOARTHRITIS OF BOTH FIRST CARPOMETACARPAL JOINTS: Primary | ICD-10-CM

## 2025-03-27 DIAGNOSIS — M19.041 PRIMARY OSTEOARTHRITIS OF BOTH HANDS: ICD-10-CM

## 2025-03-27 DIAGNOSIS — M19.042 PRIMARY OSTEOARTHRITIS OF BOTH HANDS: ICD-10-CM

## 2025-03-27 PROCEDURE — 3066F NEPHROPATHY DOC TX: CPT | Mod: CPTII,,,

## 2025-03-27 PROCEDURE — 3008F BODY MASS INDEX DOCD: CPT | Mod: CPTII,,,

## 2025-03-27 PROCEDURE — 3044F HG A1C LEVEL LT 7.0%: CPT | Mod: CPTII,,,

## 2025-03-27 PROCEDURE — 3079F DIAST BP 80-89 MM HG: CPT | Mod: CPTII,,,

## 2025-03-27 PROCEDURE — 1160F RVW MEDS BY RX/DR IN RCRD: CPT | Mod: CPTII,,,

## 2025-03-27 PROCEDURE — 99214 OFFICE O/P EST MOD 30 MIN: CPT | Mod: ,,,

## 2025-03-27 PROCEDURE — 3077F SYST BP >= 140 MM HG: CPT | Mod: CPTII,,,

## 2025-03-27 PROCEDURE — 3061F NEG MICROALBUMINURIA REV: CPT | Mod: CPTII,,,

## 2025-03-27 PROCEDURE — 1159F MED LIST DOCD IN RCRD: CPT | Mod: CPTII,,,

## 2025-03-27 RX ORDER — METHYLPREDNISOLONE 4 MG/1
TABLET ORAL
Qty: 1 EACH | Refills: 0 | Status: SHIPPED | OUTPATIENT
Start: 2025-03-27

## 2025-03-27 NOTE — PROGRESS NOTES
Orthopaedic Clinic  Orthopedic Clinic Note      Chief Complaint:   Chief Complaint   Patient presents with    Left Hand - Pain     (Prior  and Currently ) SORAYA Hand Pain/Ostearthritis patient states ongoing over 20years swelling in thumb and little fingers in both hands she has had trigger finger in Rt thumb, no prior injections. She does have trouble opening things, sensitive to touch, she has dropped things. Worse at night after a full day.     Right Hand - Pain     (RHD) SORAYA Hand Pain/Ostearthritis     Referring Physician: Miranda Kaur NP      History of Present Illness:    This is a 64 y.o. year old female presenting with complaints of bilateral hand pain ongoing for 20 years, left worse than right.  She reports pain and worsening deformities throughout the distal aspect of her fingers, but her main concern is her thumbs.  She reports significant pain at the base of her thumbs that is sometimes associated with swelling.  It is causing her difficulty with opening things and she has sensitivity and perceived weakness in the hands.  She states that she has actually dropped items.  Pain radiates into the distal aspect of the thumb and into the proximal aspect of the wrist.  She denies any numbness and tingling in the hands.  She has attempted over-the-counter medications, topical anti-inflammatories, prescribed anti-inflammatories, physical/occupational therapy, activity modification, and bracing with no improvement in her symptoms.  She was a former  and currently works as a .      Past Medical History:   Diagnosis Date    Chronic pain     foot pain    Personal history of colonic polyps 2020    Dr Yao Diaz       Past Surgical History:   Procedure Laterality Date     SECTION      CHOLECYSTECTOMY      COLONOSCOPY  2020    Dr. Yao Diaz    COLONOSCOPY, WITH POLYPECTOMY USING HOT BIOPSY FORCEPS N/A  2/6/2025    Procedure: COLONOSCOPY, WITH POLYPECTOMY USING cold  BIOPSY FORCEPS;  Surgeon: Han Peters MD;  Location: [facility] ENDOSCOPY;  Service: Endoscopy;  Laterality: N/A;    DILATION AND CURETTAGE OF UTERUS  1978    HYSTERECTOMY      INSERTION, STENT, ARTERY, FEMOROPOPLITEAL  08/23/2021    TONSILLECTOMY         Current Outpatient Medications   Medication Sig    pregabalin (LYRICA) 150 MG capsule Take 150 mg by mouth once daily.    acetaminophen (TYLENOL) 500 MG tablet Take 500 mg by mouth every 6 (six) hours as needed for Pain. (Patient not taking: Reported on 1/24/2025)    clotrimazole-betamethasone 1-0.05% (LOTRISONE) cream Apply topically 2 (two) times daily. (Patient not taking: Reported on 1/24/2025)    gabapentin (NEURONTIN) 300 MG capsule Take 1 capsule (300 mg total) by mouth every evening. (Patient not taking: Reported on 2/6/2025)    meloxicam (MOBIC) 7.5 MG tablet TAKE 1 TABLET BY MOUTH DAILY WITH FOOD AS NEEDED FOR PAIN **AVOID OTHER NSAIDS** (Patient not taking: Reported on 3/27/2025)    methylPREDNISolone (MEDROL DOSEPACK) 4 mg tablet use as directed    polyethylene glycol (MOVIPREP) 100-7.5-2.691 gram solution Take as directed prior to colonoscopy (Patient not taking: Reported on 3/27/2025)    venlafaxine (EFFEXOR-XR) 75 MG 24 hr capsule Take 1 capsule (75 mg total) by mouth once daily. (Patient not taking: Reported on 3/27/2025)     No current facility-administered medications for this visit.       Review of patient's allergies indicates:   Allergen Reactions    Grass pollen-june grass standard Itching    Mayonnaise Nausea And Vomiting       Family History   Problem Relation Name Age of Onset    Hypertension Mother      Deafness Mother      Blindness Mother      Alzheimer's disease Mother      Heart disease Father      Diabetes Father      Colon cancer Maternal Grandmother         Social History[1]        Review of Systems:  All review of systems negative except for those stated in the  "HPI.    Examination:    Vital Signs:    Vitals:    03/27/25 0926 03/27/25 1006   BP: (!) 147/78 (!) 152/84   Pulse: 70 71   Weight: 74.8 kg (165 lb)    Height: 5' 2" (1.575 m)    PainSc:   7        Body mass index is 30.18 kg/m².    Physical Examination:  General: Well-developed, well-nourished.  Neuro: Alert and oriented x 3.  Psych: Normal mood and affect.  Card: Regular rate and rhythm  Resp: Respirations regular and unlabored  Bilateral Hand Exam:  Arthritic deformities at the DIP joints, most notable in the little and middle finger of the right hand and throughout fingers 2-5 in the left hand.  Tenderness to palpation over the CMC joint.  Range of motion within functional limits.  Normal skin appearance and hand and fingers appear well perfused.  Sensibility normal.      Imaging:  Prior three views of the left hand demonstrate significant degenerative changes and joint space narrowing in the CMC joint and DIP joints of the pointer, middle, ring, and little finger.  Prior three views of the right hand demonstrate moderate degenerative changes and joint space narrowing in the CMC joint and the IP joints of the middle and little finger.    Assessment: Primary osteoarthritis of both first carpometacarpal joints  -     methylPREDNISolone (MEDROL DOSEPACK) 4 mg tablet; use as directed  Dispense: 1 each; Refill: 0    Primary osteoarthritis of both hands  -     Ambulatory referral/consult to Orthopedics  -     methylPREDNISolone (MEDROL DOSEPACK) 4 mg tablet; use as directed  Dispense: 1 each; Refill: 0      Plan:  Prior x-rays were reviewed with the patient.  She has failed to respond to numerous conservative treatments.  However, she has not attempted oral corticosteroids or corticosteroid injections.  Prescription routed for Medrol Dosepak.  Continue home exercises with activity modification as necessary.  Continue over-the-counter topical analgesic.  Plan to refer her to Ivan Ruelas MD for CMC corticosteroid " injections.  We briefly discussed surgical intervention for definitive treatment of her symptoms if they persist despite exhaustive conservative treatments.  She verbalized understanding of the plan of care with no further questions.         Follow up for Further evaluation and recommendations with Ivan Ruelas MD.      DISCLAIMER: This note may have been dictated using voice recognition software and may contain grammatical errors.     NOTE: Consult report sent to referring provider via [employer] EMR.           [1]   Social History  Socioeconomic History    Marital status:     Number of children: 3   Occupational History    Occupation:      Comment: [employer]     Comment: Works at [employer]   Tobacco Use    Smoking status: Never    Smokeless tobacco: Never   Substance and Sexual Activity    Alcohol use: Not Currently    Drug use: Never    Sexual activity: Yes     Partners: Male     Social Drivers of Health     Physical Activity: Inactive (9/19/2023)    Exercise Vital Sign     Days of Exercise per Week: 0 days     Minutes of Exercise per Session: 0 min

## 2025-03-28 ENCOUNTER — HOSPITAL ENCOUNTER (OUTPATIENT)
Dept: RADIOLOGY | Facility: HOSPITAL | Age: 65
Discharge: HOME | End: 2025-03-28
Attending: NURSE PRACTITIONER
Payer: COMMERCIAL

## 2025-03-28 DIAGNOSIS — Z12.31 VISIT FOR SCREENING MAMMOGRAM: ICD-10-CM

## 2025-03-28 DIAGNOSIS — M85.80 OSTEOPENIA, UNSPECIFIED LOCATION: ICD-10-CM

## 2025-03-28 DIAGNOSIS — Z78.0 POSTMENOPAUSE: ICD-10-CM

## 2025-03-28 PROCEDURE — 77080 DXA BONE DENSITY AXIAL: CPT | Mod: TC

## 2025-03-28 PROCEDURE — 77067 SCR MAMMO BI INCL CAD: CPT | Mod: TC

## 2025-03-28 PROCEDURE — 77067 SCR MAMMO BI INCL CAD: CPT | Mod: 26,,, | Performed by: RADIOLOGY

## 2025-03-28 PROCEDURE — 77063 BREAST TOMOSYNTHESIS BI: CPT | Mod: 26,,, | Performed by: RADIOLOGY

## 2025-04-07 ENCOUNTER — OFFICE VISIT (OUTPATIENT)
Dept: ORTHOPEDICS | Facility: CLINIC | Age: 65
End: 2025-04-07
Payer: COMMERCIAL

## 2025-04-07 VITALS — WEIGHT: 164.88 LBS | HEIGHT: 62 IN | BODY MASS INDEX: 30.34 KG/M2

## 2025-04-07 DIAGNOSIS — M18.31 POST-TRAUMATIC OSTEOARTHRITIS OF FIRST CARPOMETACARPAL JOINT OF RIGHT HAND: Primary | ICD-10-CM

## 2025-04-07 PROCEDURE — 20600 DRAIN/INJ JOINT/BURSA W/O US: CPT | Mod: RT,,, | Performed by: ORTHOPAEDIC SURGERY

## 2025-04-07 PROCEDURE — 99214 OFFICE O/P EST MOD 30 MIN: CPT | Mod: 25,,, | Performed by: ORTHOPAEDIC SURGERY

## 2025-04-07 PROCEDURE — 3061F NEG MICROALBUMINURIA REV: CPT | Mod: CPTII,,, | Performed by: ORTHOPAEDIC SURGERY

## 2025-04-07 PROCEDURE — 3066F NEPHROPATHY DOC TX: CPT | Mod: CPTII,,, | Performed by: ORTHOPAEDIC SURGERY

## 2025-04-07 PROCEDURE — 1159F MED LIST DOCD IN RCRD: CPT | Mod: CPTII,,, | Performed by: ORTHOPAEDIC SURGERY

## 2025-04-07 PROCEDURE — 1160F RVW MEDS BY RX/DR IN RCRD: CPT | Mod: CPTII,,, | Performed by: ORTHOPAEDIC SURGERY

## 2025-04-07 PROCEDURE — 3008F BODY MASS INDEX DOCD: CPT | Mod: CPTII,,, | Performed by: ORTHOPAEDIC SURGERY

## 2025-04-07 PROCEDURE — 3044F HG A1C LEVEL LT 7.0%: CPT | Mod: CPTII,,, | Performed by: ORTHOPAEDIC SURGERY

## 2025-04-07 RX ADMIN — LIDOCAINE HYDROCHLORIDE 1 ML: 20 INJECTION, SOLUTION INFILTRATION; PERINEURAL at 10:04

## 2025-04-07 RX ADMIN — BETAMETHASONE SODIUM PHOSPHATE AND BETAMETHASONE ACETATE 6 MG: 3; 3 INJECTION, SUSPENSION INTRA-ARTICULAR; INTRALESIONAL; INTRAMUSCULAR; SOFT TISSUE at 10:04

## 2025-04-07 NOTE — PROGRESS NOTES
"Subjective:    CC: Pain (Soraya thumb pain, ref by Kathia for SORAYA CMC inj, reports taking prescribed lyrica, reports will radiate up arms at times, denies any numbness in thumbs, reports more annoying pain then anything, reports constant pain, )       HPI:  Patient comes in today complaining of bilateral thumb pain.  Patient has a history of underlying arthritis in the base of the thumbs.  She has tried multiple conservative treatments, remains be symptomatic.    ROS: Refer to HPI for pertinent ROS. All other 12 point systems negative.    Objective:  Vitals:    04/07/25 1100   Weight: 74.8 kg (164 lb 14.5 oz)   Height: 5' 2" (1.575 m)        Physical Exam:  Right hand compartments are soft and warm.  Skin is intact.  There is no signs or symptoms of DVT or infection.  She is point tender about the base of the right thumb, she does have positive CMC grind negative Finkelstein exam, stable to stressing otherwise, neurovascular intact distally.    Images: . Images Reviewed and discussed with patient.    Assessment:  1. Post-traumatic osteoarthritis of first carpometacarpal joint of right hand  - Small Joint Aspiration/Injection: R thumb CMC        Plan:  At this time we discussed her physical exam and previous imaging findings.  We have discussed her underlying arthritis.  We have discussed additional conservative treatments well surgical intervention.  We have discussed a thumb wrap.  She tolerated the steroid injection very well to the right thumb CMC joint.  I would like see him back in 4 weeks, we have discussed her left thumb well.    Follow UP: No follow-ups on file.    Small Joint Aspiration/Injection: R thumb CMC    Date/Time: 4/7/2025 10:30 AM    Performed by: Ivan Ruelas MD  Authorized by: Ivan Ruelas MD    Consent Done?:  Yes (Verbal)  Indications:  Arthritis and pain  Site marked: the procedure site was marked    Timeout: prior to procedure the correct patient, procedure, and site was verified  "   Prep: patient was prepped and draped in usual sterile fashion      Location:  Thumb  Site:  R thumb CMC  Medications:  1 mL LIDOcaine HCL 20 mg/ml (2%) 20 mg/mL (2 %); 6 mg betamethasone acetate-betamethasone sodium phosphate 6 mg/mL  Patient tolerance:  Patient tolerated the procedure well with no immediate complications

## 2025-04-07 NOTE — PROCEDURES
Small Joint Aspiration/Injection: R thumb CMC    Date/Time: 4/7/2025 10:30 AM    Performed by: Ivan Ruelas MD  Authorized by: Ivan Ruelas MD    Consent Done?:  Yes (Verbal)  Indications:  Arthritis and pain  Site marked: the procedure site was marked    Timeout: prior to procedure the correct patient, procedure, and site was verified    Prep: patient was prepped and draped in usual sterile fashion      Location:  Thumb  Site:  R thumb CMC  Medications:  1 mL LIDOcaine HCL 20 mg/ml (2%) 20 mg/mL (2 %); 6 mg betamethasone acetate-betamethasone sodium phosphate 6 mg/mL  Patient tolerance:  Patient tolerated the procedure well with no immediate complications

## 2025-04-08 RX ORDER — BETAMETHASONE SODIUM PHOSPHATE AND BETAMETHASONE ACETATE 3; 3 MG/ML; MG/ML
6 INJECTION, SUSPENSION INTRA-ARTICULAR; INTRALESIONAL; INTRAMUSCULAR; SOFT TISSUE
Status: DISCONTINUED | OUTPATIENT
Start: 2025-04-07 | End: 2025-04-08 | Stop reason: HOSPADM

## 2025-04-08 RX ORDER — LIDOCAINE HYDROCHLORIDE 20 MG/ML
1 INJECTION, SOLUTION INFILTRATION; PERINEURAL
Status: DISCONTINUED | OUTPATIENT
Start: 2025-04-07 | End: 2025-04-08 | Stop reason: HOSPADM

## 2025-04-09 ENCOUNTER — TELEPHONE (OUTPATIENT)
Dept: INTERNAL MEDICINE | Facility: CLINIC | Age: 65
End: 2025-04-09
Payer: COMMERCIAL

## 2025-04-09 NOTE — TELEPHONE ENCOUNTER
Contacted of PCP message below.She informed me that she was seen by Dr. Ivan Ruelas  received thumb injection yesterday and will return in four weeks to have left thumb injection.  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        Please let patient know lab test results did not show any abnormal findings to suggest autoimmune arthritis (RA).   X-ray of b/l hands with degenerative changes (arthritis) noted and questionable fracture of thumb of right hand. Please ask if any recent injury/ fall/trauma? I did order referral to Orthopedic clinic at [facility]. She will receive a call to schedule appointment.   Please inform patient bone density results shows stable findings of osteopenia. Recommend continue calcium/ Vitamin D supplementation.

## 2025-04-09 NOTE — TELEPHONE ENCOUNTER
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  ----- Message -----  From: Miranda Kaur NP  Sent: 4/8/2025  10:34 PM CDT  To: Natasha MÁRQUEZ Staff    Please inform patient bone density results shows stable findings of osteopenia. Recommend continue calcium/ Vitamin D supplementation.   ----- Message -----  From: Julianna, Rad Results In  Sent: 3/28/2025   3:19 PM CDT  To: Miranda Kaur NP

## 2025-05-08 ENCOUNTER — OFFICE VISIT (OUTPATIENT)
Dept: ORTHOPEDICS | Facility: CLINIC | Age: 65
End: 2025-05-08
Payer: COMMERCIAL

## 2025-05-08 VITALS
SYSTOLIC BLOOD PRESSURE: 125 MMHG | WEIGHT: 168.38 LBS | HEIGHT: 62 IN | DIASTOLIC BLOOD PRESSURE: 80 MMHG | BODY MASS INDEX: 30.99 KG/M2 | HEART RATE: 60 BPM

## 2025-05-08 DIAGNOSIS — M18.31 POST-TRAUMATIC OSTEOARTHRITIS OF FIRST CARPOMETACARPAL JOINT OF RIGHT HAND: Primary | ICD-10-CM

## 2025-05-08 PROCEDURE — 3079F DIAST BP 80-89 MM HG: CPT | Mod: CPTII,,, | Performed by: ORTHOPAEDIC SURGERY

## 2025-05-08 PROCEDURE — 3044F HG A1C LEVEL LT 7.0%: CPT | Mod: CPTII,,, | Performed by: ORTHOPAEDIC SURGERY

## 2025-05-08 PROCEDURE — 3061F NEG MICROALBUMINURIA REV: CPT | Mod: CPTII,,, | Performed by: ORTHOPAEDIC SURGERY

## 2025-05-08 PROCEDURE — 1159F MED LIST DOCD IN RCRD: CPT | Mod: CPTII,,, | Performed by: ORTHOPAEDIC SURGERY

## 2025-05-08 PROCEDURE — 3066F NEPHROPATHY DOC TX: CPT | Mod: CPTII,,, | Performed by: ORTHOPAEDIC SURGERY

## 2025-05-08 PROCEDURE — 3008F BODY MASS INDEX DOCD: CPT | Mod: CPTII,,, | Performed by: ORTHOPAEDIC SURGERY

## 2025-05-08 PROCEDURE — 99213 OFFICE O/P EST LOW 20 MIN: CPT | Mod: ,,, | Performed by: ORTHOPAEDIC SURGERY

## 2025-05-08 PROCEDURE — 3074F SYST BP LT 130 MM HG: CPT | Mod: CPTII,,, | Performed by: ORTHOPAEDIC SURGERY

## 2025-05-08 PROCEDURE — 1160F RVW MEDS BY RX/DR IN RCRD: CPT | Mod: CPTII,,, | Performed by: ORTHOPAEDIC SURGERY

## 2025-05-10 NOTE — PROGRESS NOTES
"Subjective:    CC: Hand Pain (David CMC. Last cortisone injection Rt  4/8/25 gave a lot of relief, pain slowly coming back. Reporting shooting pains a few times a week. Soreness after using a lot. Pain in the Lt hand has decreased some. Rt handed dominant. Requesting inj in LT hand. )       HPI:  Patient returns today for repeat exam.  Patient states the right thumb is doing much better.  She had a previous injection last month.  She states it is slowly coming back.    ROS: Refer to HPI for pertinent ROS. All other 12 point systems negative.    Objective:  Vitals:    05/08/25 1000   BP: 125/80   BP Location: Right arm   Patient Position: Sitting   Pulse: 60   Weight: 76.4 kg (168 lb 6.4 oz)   Height: 5' 2" (1.575 m)        Physical Exam:  Right hand compartment soft and warm.  Skin is intact.  There is no signs symptoms of DVT or infection.  He is much less tender about the base of the right thumb.  She is stable to stressing there was no instability, neurovascular intact distally.    Images: . Images Reviewed and discussed with patient.    Assessment:  1. Post-traumatic osteoarthritis of first carpometacarpal joint of right hand        Plan:  At this time we discussed her physical exam and previous imaging findings.  We have discussed the brace, hand exercises.  She states her left hand is not that bad today.  We have discussed repeating her injection in 2 months for her right thumb if needed.    Follow UP: No follow-ups on file.              "

## 2025-05-22 ENCOUNTER — OFFICE VISIT (OUTPATIENT)
Dept: NEUROLOGY | Facility: CLINIC | Age: 65
End: 2025-05-22
Payer: COMMERCIAL

## 2025-05-22 VITALS
WEIGHT: 168 LBS | HEIGHT: 62 IN | BODY MASS INDEX: 30.91 KG/M2 | DIASTOLIC BLOOD PRESSURE: 80 MMHG | SYSTOLIC BLOOD PRESSURE: 134 MMHG

## 2025-05-22 DIAGNOSIS — M48.061 FORAMINAL STENOSIS OF LUMBAR REGION: ICD-10-CM

## 2025-05-22 DIAGNOSIS — G62.9 POLYNEUROPATHY: ICD-10-CM

## 2025-05-22 DIAGNOSIS — R20.2 PARESTHESIAS: ICD-10-CM

## 2025-05-22 PROCEDURE — 3079F DIAST BP 80-89 MM HG: CPT | Mod: CPTII,S$GLB,, | Performed by: PSYCHIATRY & NEUROLOGY

## 2025-05-22 PROCEDURE — 1159F MED LIST DOCD IN RCRD: CPT | Mod: CPTII,S$GLB,, | Performed by: PSYCHIATRY & NEUROLOGY

## 2025-05-22 PROCEDURE — 99999 PR PBB SHADOW E&M-EST. PATIENT-LVL III: CPT | Mod: PBBFAC,,, | Performed by: PSYCHIATRY & NEUROLOGY

## 2025-05-22 PROCEDURE — 3061F NEG MICROALBUMINURIA REV: CPT | Mod: CPTII,S$GLB,, | Performed by: PSYCHIATRY & NEUROLOGY

## 2025-05-22 PROCEDURE — 3075F SYST BP GE 130 - 139MM HG: CPT | Mod: CPTII,S$GLB,, | Performed by: PSYCHIATRY & NEUROLOGY

## 2025-05-22 PROCEDURE — 3044F HG A1C LEVEL LT 7.0%: CPT | Mod: CPTII,S$GLB,, | Performed by: PSYCHIATRY & NEUROLOGY

## 2025-05-22 PROCEDURE — 99204 OFFICE O/P NEW MOD 45 MIN: CPT | Mod: S$GLB,,, | Performed by: PSYCHIATRY & NEUROLOGY

## 2025-05-22 PROCEDURE — 3008F BODY MASS INDEX DOCD: CPT | Mod: CPTII,S$GLB,, | Performed by: PSYCHIATRY & NEUROLOGY

## 2025-05-22 PROCEDURE — 3066F NEPHROPATHY DOC TX: CPT | Mod: CPTII,S$GLB,, | Performed by: PSYCHIATRY & NEUROLOGY

## 2025-05-22 RX ORDER — DICLOFENAC SODIUM 50 MG/1
50 TABLET, DELAYED RELEASE ORAL 2 TIMES DAILY
COMMUNITY

## 2025-05-22 NOTE — PROGRESS NOTES
Subjective     Patient ID: Ashley Ruano is a 64 y.o. female.    Chief Complaint: polyneuropathy , foraminal stenosis of lumbar region, and Numbness (New pt - referred by Miranda Kaur NP)    HPI  Numbness /tingling bilateral feet  Occ shingles LLE  No motor weakness  Not diabetic  No falls  Mild degen changes L spine mri; report only  Chronic foot pain  Review of Systems  The remainder of the 14 system ROS is noncontributory or negative unless mentioned/reviewed above.       Objective     Physical Exam  Mental Status: Alert and oriented x3. Language is fluent with good comprehension.    Cranial Nerve: Pupils are equal, round, and reactive to light. Visual fields are intact to confrontation. Normal fundi. Ocular movements are intact. Face is symmetric at rest and with activation with intact sensation throughout. Hearing intact to finger rub bilaterally. Muscles of tongue and palate activate symmetrically. No dysarthria. Strength is full in sternocleidomastoid and trapezius bilaterally.    Motor: Muscle bulk and tone are normal. Strength is 5/5 in all four extremities both proximally and distally. Intact fine motor movements bilaterally. There is no pronator drift or satelliting on arm roll.    Sensory: Sensation is intact to light touch, pinprick, vibration, and proprioception throughout. Romberg is negative.    Reflexes: 2+ and symmetric at the biceps, triceps, brachioradialis, patella, and Achilles bilaterally. Plantar response is flexor bilaterally.    Coordination: No dysmetria on finger-nose-finger or heel-knee-shin. Normal rapid alternating movements. Fast finger tapping with normal amplitude and speed.    Gait: Narrow based with normal stride length and good arm swing bilaterally. Able to walk on heels, toes, and in tandem.       Assessment and Plan     1. Polyneuropathy  -     Ambulatory referral/consult to Neurology  -     Immunofixation & Protein Electrophoresis & Immunoglobulins; Future; Expected date:  05/22/2025    2. Foraminal stenosis of lumbar region  Overview:  9/23/24 MRI Lumbar spine with mild b/l foraminal stenosis     Orders:  -     Ambulatory referral/consult to Neurology    3. Paresthesias  -     Ambulatory referral/consult to Neurology  -     Immunofixation & Protein Electrophoresis & Immunoglobulins; Future; Expected date: 05/22/2025        Small fiber neuropathy  Continue lyrica  Add nervive  Check spep; if +ve; refer to hem/onc         Follow up if symptoms worsen or fail to improve.

## 2025-06-12 ENCOUNTER — LAB VISIT (OUTPATIENT)
Dept: LAB | Facility: HOSPITAL | Age: 65
End: 2025-06-12
Attending: PSYCHIATRY & NEUROLOGY
Payer: COMMERCIAL

## 2025-06-12 ENCOUNTER — OFFICE VISIT (OUTPATIENT)
Dept: INTERNAL MEDICINE | Facility: CLINIC | Age: 65
End: 2025-06-12
Payer: COMMERCIAL

## 2025-06-12 VITALS
DIASTOLIC BLOOD PRESSURE: 82 MMHG | RESPIRATION RATE: 18 BRPM | BODY MASS INDEX: 31.34 KG/M2 | WEIGHT: 170.31 LBS | SYSTOLIC BLOOD PRESSURE: 127 MMHG | HEART RATE: 57 BPM | HEIGHT: 62 IN | OXYGEN SATURATION: 97 %

## 2025-06-12 DIAGNOSIS — M85.80 OSTEOPENIA, UNSPECIFIED LOCATION: ICD-10-CM

## 2025-06-12 DIAGNOSIS — Z23 IMMUNIZATION DUE: ICD-10-CM

## 2025-06-12 DIAGNOSIS — R20.2 PARESTHESIAS: ICD-10-CM

## 2025-06-12 DIAGNOSIS — M48.061 FORAMINAL STENOSIS OF LUMBAR REGION: ICD-10-CM

## 2025-06-12 DIAGNOSIS — G62.9 POLYNEUROPATHY: Primary | ICD-10-CM

## 2025-06-12 DIAGNOSIS — G62.9 POLYNEUROPATHY: ICD-10-CM

## 2025-06-12 LAB
IGA SERPL-MCNC: 166 MG/DL (ref 69–517)
IGG SERPL-MCNC: 930 MG/DL (ref 522–1631)
IGM SERPL-MCNC: 90 MG/DL (ref 33–293)

## 2025-06-12 PROCEDURE — 3079F DIAST BP 80-89 MM HG: CPT | Mod: CPTII,,, | Performed by: NURSE PRACTITIONER

## 2025-06-12 PROCEDURE — 3061F NEG MICROALBUMINURIA REV: CPT | Mod: CPTII,,, | Performed by: NURSE PRACTITIONER

## 2025-06-12 PROCEDURE — 82784 ASSAY IGA/IGD/IGG/IGM EACH: CPT

## 2025-06-12 PROCEDURE — 3044F HG A1C LEVEL LT 7.0%: CPT | Mod: CPTII,,, | Performed by: NURSE PRACTITIONER

## 2025-06-12 PROCEDURE — 3066F NEPHROPATHY DOC TX: CPT | Mod: CPTII,,, | Performed by: NURSE PRACTITIONER

## 2025-06-12 PROCEDURE — 84165 PROTEIN E-PHORESIS SERUM: CPT

## 2025-06-12 PROCEDURE — 99214 OFFICE O/P EST MOD 30 MIN: CPT | Mod: S$PBB,,, | Performed by: NURSE PRACTITIONER

## 2025-06-12 PROCEDURE — 3074F SYST BP LT 130 MM HG: CPT | Mod: CPTII,,, | Performed by: NURSE PRACTITIONER

## 2025-06-12 PROCEDURE — 3008F BODY MASS INDEX DOCD: CPT | Mod: CPTII,,, | Performed by: NURSE PRACTITIONER

## 2025-06-12 PROCEDURE — 99214 OFFICE O/P EST MOD 30 MIN: CPT | Mod: PBBFAC | Performed by: NURSE PRACTITIONER

## 2025-06-12 PROCEDURE — 36415 COLL VENOUS BLD VENIPUNCTURE: CPT

## 2025-06-12 PROCEDURE — 83521 IG LIGHT CHAINS FREE EACH: CPT | Mod: 91

## 2025-06-12 NOTE — ASSESSMENT & PLAN NOTE
DXA 3/2025 osteopenia   Daily calcium and vitamin D supplementation: Calcium 1500 mg daily; high calcium foods include salmon or sardines with bones, low fat yogurt, skim milk, green vegetables, and cheese. Vitamin D 3 2000 IU once daily; high vitamin D foods include mushrooms, fish oil, cod liver, salmon, tuna, egg yolks, milk fortified with Vitamin D and foods fortified with Vitamin D such as cereals and oatmeal.  Decrease/ Avoid use of alcohol, tobacco use, caffeine.  Educated on health benefits of at least 5 days/ week of 30 minutes moderate intensity exercise (brisk walking) and 2 or more days/ week of muscle strength activities  Fall precautions discussed such as removing scatter and clutter in home, use hand rails when walking up stairs, proper foot wear, and use of bright lights in home especially at night.

## 2025-06-12 NOTE — ASSESSMENT & PLAN NOTE
Pt has intermittent occ back pain  Referred to Neurology for EMG- no EMG testing noted; UPEP/ SPEP ordered, pt to complete today   She is being referred to neurology/ nsgy ? By podiatrist, encouraged pt to follow up regarding.

## 2025-06-12 NOTE — PROGRESS NOTES
Miranda L Natasha, NP   [facility]  [address] [facility] [address]      PATIENT NAME: Ashley Ruano  : 1960  DATE: 25  MRN: [id number]        History of Present Illness / Problem Focused Workflow     Ashley Ruano presents to the clinic with Peripheral Neuropathy (Pt present for neuropathy pain ongoing with shooting and burning pain in both sets of toes and fingers) and Arthritis (Pt present for arthritis pain ongoing and generalized )     63 yo female unaccompanied for f/u for neuropathy. Last seen 3/6/25. PMh includes colon polyps. Active diagnosis CTS, osteopenia, osteoarthritis, hot flashes, plantar fasciitis, OA, diverticulosis, venous reflux, polyneuropathy, and obesity. She was seen by neurologist, recommended nervive and blood work. She did not completed blood work; she forgot but will do today. States podiatrist is also sending referral to neurologist. Continues with neuropathy to b/l hands and feet. Little back pain. Worse pain to b/l hands, feet. Has upcoming Ortho appointment. Wants second shingrix vaccine today however having technical difficulties obtaining from pharmacy and will be scheduled for nurse visit to receive another day. Denies any other concerns or complaints today.     Screenings:  MMG 3/28/25  DEXA 3/28/25 osteopenia   CRC 25 diverticulosis in sigmoid colon and descending colon, two 2-3 mm polyps in rectum; repeat 5 years (due )  PAP     Other providers:   Orthopedic-  Suraj  Dr. Myers - Podiatry  Dr. Stafford  Former patient of Dr. Hylton- Rheumatology - discharged  PT  [facility]    Review of Systems     Review of Systems   Constitutional: Negative.    HENT: Negative.     Eyes: Negative.    Respiratory: Negative.     Cardiovascular: Negative.    Gastrointestinal: Negative.    Endocrine: Negative.    Genitourinary: Negative.    Musculoskeletal:  Positive for arthralgias.   Skin: Negative.   "  Allergic/Immunologic: Negative.    Neurological:  Positive for numbness.   Hematological: Negative.    Psychiatric/Behavioral: Negative.         Medical / Social / Family History     -------------------------------------    Chronic pain    foot pain    Personal history of colonic polyps    Dr Yao Diaz        Past Surgical History:   Procedure Laterality Date     SECTION      CHOLECYSTECTOMY      COLONOSCOPY  2020    Dr. Yao Diaz    COLONOSCOPY, WITH POLYPECTOMY USING HOT BIOPSY FORCEPS N/A 2025    Procedure: COLONOSCOPY, WITH POLYPECTOMY USING cold  BIOPSY FORCEPS;  Surgeon: Han Peters MD;  Location: [facility] ENDOSCOPY;  Service: Endoscopy;  Laterality: N/A;    DILATION AND CURETTAGE OF UTERUS      HYSTERECTOMY      INSERTION, STENT, ARTERY, FEMOROPOPLITEAL  2021    TONSILLECTOMY         Social History[1]     Family History   Problem Relation Name Age of Onset    Hypertension Mother      Deafness Mother      Blindness Mother      Alzheimer's disease Mother      Heart disease Father      Diabetes Father      Colon cancer Maternal Grandmother          Medications and Allergies     Medications  Current Outpatient Medications   Medication Instructions    pregabalin (LYRICA) 150 mg, Daily    triamcinolone acetonide 0.1% (KENALOG) 0.1 % cream Topical (Top), 2 times daily       Allergies  Review of patient's allergies indicates:   Allergen Reactions    Grass pollen- grass standard Itching    Mayonnaise Nausea And Vomiting       Physical Examination     Visit Vitals  /82 (BP Location: Right arm, Patient Position: Sitting)   Pulse (!) 57   Resp 18   Ht 5' 2" (1.575 m)   Wt 77.2 kg (170 lb 4.8 oz)   LMP  (LMP Unknown)   SpO2 97%   BMI 31.15 kg/m²       Physical Exam  Constitutional:       General: She is not in acute distress.     Appearance: Normal appearance. She is obese. She is not ill-appearing or diaphoretic.   HENT:      Head: Normocephalic. "   Cardiovascular:      Rate and Rhythm: Normal rate and regular rhythm.      Pulses:           Dorsalis pedis pulses are 2+ on the right side and 2+ on the left side.        Posterior tibial pulses are 2+ on the right side and 2+ on the left side.      Heart sounds: No murmur heard.  Pulmonary:      Effort: Pulmonary effort is normal. No respiratory distress.      Breath sounds: Normal breath sounds. No stridor. No wheezing, rhonchi or rales.   Musculoskeletal:      Right lower leg: No edema.      Left lower leg: No edema.   Skin:     General: Skin is warm and dry.   Neurological:      Mental Status: She is alert and oriented to person, place, and time. Mental status is at baseline.      Coordination: Coordination normal.      Gait: Gait normal.   Psychiatric:         Mood and Affect: Mood normal.         Behavior: Behavior normal.         Thought Content: Thought content normal.         Judgment: Judgment normal.           Results     Lab Results   Component Value Date    WBC 5.99 03/03/2025    RBC 4.19 (L) 03/03/2025    HGB 12.5 03/03/2025    HCT 39.1 03/03/2025    MCV 93.3 03/03/2025    MCH 29.8 03/03/2025    MCHC 32.0 (L) 03/03/2025    RDW 12.3 03/03/2025     03/03/2025    MPV 10.9 (H) 03/03/2025     Sodium   Date Value Ref Range Status   03/03/2025 143 136 - 145 mmol/L Final     Potassium   Date Value Ref Range Status   03/03/2025 4.6 3.5 - 5.1 mmol/L Final     Chloride   Date Value Ref Range Status   03/03/2025 109 (H) 98 - 107 mmol/L Final     CO2   Date Value Ref Range Status   03/03/2025 28 23 - 31 mmol/L Final     Glucose   Date Value Ref Range Status   03/03/2025 98 82 - 115 mg/dL Final     Blood Urea Nitrogen   Date Value Ref Range Status   03/03/2025 15.0 9.8 - 20.1 mg/dL Final     Creatinine   Date Value Ref Range Status   03/03/2025 0.66 0.55 - 1.02 mg/dL Final     Calcium   Date Value Ref Range Status   03/03/2025 9.4 8.4 - 10.2 mg/dL Final     Protein Total   Date Value Ref Range Status    06/12/2025 7.2 5.8 - 7.6 gm/dL Final     Albumin   Date Value Ref Range Status   06/12/2025 3.8 3.4 - 4.8 g/dL Final     Bilirubin Total   Date Value Ref Range Status   03/03/2025 0.7 <=1.5 mg/dL Final     ALP   Date Value Ref Range Status   03/03/2025 78 40 - 150 unit/L Final     AST   Date Value Ref Range Status   03/03/2025 20 5 - 34 unit/L Final     ALT   Date Value Ref Range Status   03/03/2025 18 0 - 55 unit/L Final     Estimated GFR-Non    Date Value Ref Range Status   09/21/2021 >105 >>=90 mL/min/1.73 m2 Final     Lab Results   Component Value Date    CHOL 214 (H) 03/03/2025     Lab Results   Component Value Date    HDL 60 03/03/2025     Lab Results   Component Value Date    TRIG 93 03/03/2025     Lab Results   Component Value Date    .00 03/03/2025     Lab Results   Component Value Date    TSH 1.247 03/03/2025     Lab Results   Component Value Date    PHUR 6.0 03/03/2025    PROTEINUA Trace (A) 03/03/2025    GLUCUA Normal 03/03/2025    KETONESU Negative 08/21/2020    OCCULTUA 1+ (A) 03/03/2025    NITRITE Negative 03/03/2025    LEUKOCYTESUR 500 (A) 03/03/2025     Lab Results   Component Value Date    HGBA1C 5.1 03/03/2025    HGBA1C 5.1 09/18/2023    HGBA1C 5.1 09/13/2022     Lab Results   Component Value Date    MICALBCREAT 7.3 03/03/2025        Assessment       ICD-10-CM ICD-9-CM   1. Polyneuropathy  G62.9 356.9   2. Osteopenia, unspecified location  M85.80 733.90   3. Foraminal stenosis of lumbar region  M48.061 724.02   4. Immunization due  Z23 V05.9       Plan       Problem List Items Addressed This Visit          Neuro    Foraminal stenosis of lumbar region    Overview   9/23/24 MRI Lumbar spine with mild b/l foraminal stenosis          Current Assessment & Plan   Pt has intermittent occ back pain  Referred to Neurology for EMG- no EMG testing noted; UPEP/ SPEP ordered, pt to complete today   She is being referred to neurology/ nsgy ? By podiatrist, encouraged pt to follow up  regarding.          Polyneuropathy - Primary    Current Assessment & Plan   Pt was seen by neurology with recommendation to continue lyrica, add nervive and complete immunofixation testing. If abnormal will refer to hem/onc per neurology provider notes. Encouraged pt to complete testing as ordered by neurologist and to f/u with neurologist re results, further plan, emg, etc.             Orthopedic    Osteopenia    Current Assessment & Plan   DXA 3/2025 osteopenia   Daily calcium and vitamin D supplementation: Calcium 1500 mg daily; high calcium foods include salmon or sardines with bones, low fat yogurt, skim milk, green vegetables, and cheese. Vitamin D 3 2000 IU once daily; high vitamin D foods include mushrooms, fish oil, cod liver, salmon, tuna, egg yolks, milk fortified with Vitamin D and foods fortified with Vitamin D such as cereals and oatmeal.  Decrease/ Avoid use of alcohol, tobacco use, caffeine.  Educated on health benefits of at least 5 days/ week of 30 minutes moderate intensity exercise (brisk walking) and 2 or more days/ week of muscle strength activities  Fall precautions discussed such as removing scatter and clutter in home, use hand rails when walking up stairs, proper foot wear, and use of bright lights in home especially at night.             Other Visit Diagnoses         Immunization due      Pt will return for nurse visit to receive- having computer/ tech issues today and unable to provide to pt.     Relevant Medications    varicella zoster (Shingrix) IM vaccine (>/= 49 yo)            Future Appointments   Date Time Provider Department Center   6/25/2025 11:00 AM NURSE, [facility]   7/10/2025 10:00 [facility] [place]   9/18/2025 10:00 AM [facility]        Follow up in about 6 months (around 12/12/2025) for polyneuropathy .    Signature:  Miranda Kaur NP  [facility]  [place] [facility]  [address] [facility] [address]    Date of encounter: 6/12/25         [1]   Social History  Socioeconomic History    Marital status:     Number of children: 3   Occupational History    Occupation:      Comment: [employer]     Comment: Works at [employer]   Tobacco Use    Smoking status: Never    Smokeless tobacco: Never   Substance and Sexual Activity    Alcohol use: Not Currently    Drug use: Never    Sexual activity: Yes     Partners: Male     Social Drivers of Health     Physical Activity: Inactive (9/19/2023)    Exercise Vital Sign     Days of Exercise per Week: 0 days     Minutes of Exercise per Session: 0 min

## 2025-06-13 LAB
ALBUMIN % SPEP (OHS): 52.86 (ref 48.1–59.5)
ALBUMIN SERPL-MCNC: 3.8 G/DL (ref 3.4–4.8)
ALBUMIN/GLOB SERPL: 1.1 RATIO (ref 1.1–2)
ALPHA 1 GLOB (OHS): 0.28 GM/DL (ref 0–0.4)
ALPHA 1 GLOB% (OHS): 3.84 (ref 2.3–4.9)
ALPHA 2 GLOB % (OHS): 11.38 (ref 6.9–13)
ALPHA 2 GLOB (OHS): 0.82 GM/DL (ref 0.4–1)
BETA GLOB (OHS): 1.21 GM/DL (ref 0.7–1.3)
BETA GLOB% (OHS): 16.84 (ref 13.8–19.7)
GAMMA GLOBULIN % (OHS): 15.09 (ref 10.1–21.9)
GAMMA GLOBULIN (OHS): 1.09 GM/DL (ref 0.4–1.8)
GLOBULIN SER-MCNC: 3.4 GM/DL (ref 2.4–3.5)
KAPPA LC FREE SER NEPH-MCNC: 2.17 MG/DL (ref 0.33–1.94)
KAPPA LC FREE/LAMBDA FREE SER NEPH: 1.78 {RATIO} (ref 0.26–1.65)
LAMBDA LC FREE SERPL-MCNC: 1.22 MG/DL (ref 0.57–2.63)
M SPIKE % (OHS): NORMAL
M SPIKE (OHS): NORMAL
PATH REV: NORMAL
PROT SERPL-MCNC: 7.2 GM/DL (ref 5.8–7.6)

## 2025-06-24 ENCOUNTER — OFFICE VISIT (OUTPATIENT)
Dept: URGENT CARE | Facility: CLINIC | Age: 65
End: 2025-06-24
Payer: COMMERCIAL

## 2025-06-24 VITALS
OXYGEN SATURATION: 95 % | HEIGHT: 62 IN | SYSTOLIC BLOOD PRESSURE: 142 MMHG | TEMPERATURE: 98 F | BODY MASS INDEX: 31.28 KG/M2 | WEIGHT: 170 LBS | RESPIRATION RATE: 20 BRPM | HEART RATE: 73 BPM | DIASTOLIC BLOOD PRESSURE: 82 MMHG

## 2025-06-24 DIAGNOSIS — L30.9 DERMATITIS: Primary | ICD-10-CM

## 2025-06-24 PROCEDURE — 99203 OFFICE O/P NEW LOW 30 MIN: CPT | Mod: ,,, | Performed by: FAMILY MEDICINE

## 2025-06-24 RX ORDER — TRIAMCINOLONE ACETONIDE 1 MG/G
CREAM TOPICAL 2 TIMES DAILY
Qty: 28.4 G | Refills: 0 | Status: SHIPPED | OUTPATIENT
Start: 2025-06-24 | End: 2025-07-01

## 2025-06-24 NOTE — ASSESSMENT & PLAN NOTE
Pt was seen by neurology with recommendation to continue lyrica, add nervive and complete immunofixation testing. If abnormal will refer to hem/onc per neurology provider notes. Encouraged pt to complete testing as ordered by neurologist and to f/u with neurologist re results, further plan, emg, etc.

## 2025-06-24 NOTE — PATIENT INSTRUCTIONS
Medication sent to pharmacy take as prescribed  Recommend to begin over-the-counter antihistamine such as Claritin or Zyrtec  May use Benadryl at bedtime as needed  Use a cold wet washcloth to affected areas to reduce itching   Take either warm or cool bath with oatmeal products such as Aveeno for skin soothing   Avoid direct sunlight   Leave the rash open to air as much as possible   Call or seek immediate medical attention if your rash gets worse, fever, stiff neck, nausea or vomiting, if you have increased pain, swelling, warmth, red streaks, pus draining from the rash, or if you have any new or worrisome symptoms that arise at home or if you do not get better as expected.   If no symptom improvement within the 5-7 days, please call or return to clinic and we will consider oral steroids.

## 2025-06-24 NOTE — PROGRESS NOTES
"Subjective:      Patient ID: Ashley Ruano is a 64 y.o. female.    Vitals:  height is 5' 2" (1.575 m) and weight is 77.1 kg (170 lb). Her oral temperature is 97.9 °F (36.6 °C). Her blood pressure is 142/82 (abnormal) and her pulse is 73. Her respiration is 20 and oxygen saturation is 95%.     Chief Complaint: Rash     Patient is a 64 y.o. female who presents to urgent care with complaints of itchy rash on right side of abdomen and left hip x3 weeks. Alleviating factors include corn starch, powder, cortisone 10 with no relief. Patient denies pain, discharge. Pt denies any new foods.        Constitution: Negative for chills, sweating, fatigue and fever.   HENT: Negative.     Neck: neck negative.   Cardiovascular: Negative.    Eyes: Negative.    Respiratory: Negative.     Gastrointestinal: Negative.    Musculoskeletal: Negative.    Skin:  Positive for rash. Negative for erythema.   Allergic/Immunologic: Positive for itching.   Neurological: Negative.       Objective:     Physical Exam   Constitutional: She is oriented to person, place, and time. She appears well-developed.   HENT:   Head: Normocephalic and atraumatic. Head is without abrasion, without contusion and without laceration.   Ears:   Right Ear: External ear normal.   Left Ear: External ear normal.   Nose: Nose normal.   Mouth/Throat: Oropharynx is clear and moist and mucous membranes are normal.   Eyes: Conjunctivae, EOM and lids are normal. Pupils are equal, round, and reactive to light.   Neck: Trachea normal and phonation normal. Neck supple.   Cardiovascular: Normal rate, regular rhythm and normal heart sounds.   Pulmonary/Chest: Effort normal and breath sounds normal. No stridor. No respiratory distress.   Musculoskeletal: Normal range of motion.         General: Normal range of motion.   Neurological: She is alert and oriented to person, place, and time.   Skin: Skin is warm, dry, intact and rash. Capillary refill takes less than 2 seconds. No " "abrasion, No burn, No bruising, No erythema and No ecchymosis         Comments: Maculopapular rash to the right lower abdomen, small spot on the left hip   Psychiatric: Her speech is normal and behavior is normal. Judgment and thought content normal.   Nursing note and vitals reviewed.         Previous History      Review of patient's allergies indicates:   Allergen Reactions    Grass pollen- grass standard Itching    Mayonnaise Nausea And Vomiting       Past Medical History:   Diagnosis Date    Chronic pain     foot pain    Personal history of colonic polyps 2020    Dr Yao Diaz     Current Outpatient Medications   Medication Instructions    pregabalin (LYRICA) 150 mg, Daily    triamcinolone acetonide 0.1% (KENALOG) 0.1 % cream Topical (Top), 2 times daily     Past Surgical History:   Procedure Laterality Date     SECTION      CHOLECYSTECTOMY      COLONOSCOPY  2020    Dr. Yao Diaz    COLONOSCOPY, WITH POLYPECTOMY USING HOT BIOPSY FORCEPS N/A 2025    Procedure: COLONOSCOPY, WITH POLYPECTOMY USING cold  BIOPSY FORCEPS;  Surgeon: Han Peters MD;  Location: [facility] ENDOSCOPY;  Service: Endoscopy;  Laterality: N/A;    DILATION AND CURETTAGE OF UTERUS      HYSTERECTOMY      INSERTION, STENT, ARTERY, FEMOROPOPLITEAL  2021    TONSILLECTOMY       Family History   Problem Relation Name Age of Onset    Hypertension Mother      Deafness Mother      Blindness Mother      Alzheimer's disease Mother      Heart disease Father      Diabetes Father      Colon cancer Maternal Grandmother         Social History[1]     Physical Exam      Vital Signs Reviewed   BP (!) 142/82 (BP Location: Left arm, Patient Position: Sitting)   Pulse 73   Temp 97.9 °F (36.6 °C) (Oral)   Resp 20   Ht 5' 2" (1.575 m)   Wt 77.1 kg (170 lb)   LMP  (LMP Unknown)   SpO2 95%   BMI 31.09 kg/m²        Procedures    Procedures     Labs     Results for orders placed or performed in visit on " 06/12/25   Immunoglobulins (IgG, IgA, IgM) Quantitative    Collection Time: 06/12/25 10:13 AM   Result Value Ref Range    IgG Level 930.00 522.00 - 1,631.00 mg/dL    IgA Level 166.0 69.0 - 517.0 mg/dL    IgM Level 90.0 33.0 - 293.0 mg/dL   Immunoglobulin Free LT Chains Blood    Collection Time: 06/12/25 10:13 AM   Result Value Ref Range    Kappa Free Light Chain 2.17 (H) 0.3300 - 1.94 mg/dL    Lambda Free Light Chain 1.22 0.5700 - 2.63 mg/dL    Kappa/Lambda FLC Ratio 1.78 (H) 0.2600 - 1.65   Immunofixation & Protein Electrophoresis    Collection Time: 06/12/25 10:13 AM   Result Value Ref Range    Protein Total 7.2 5.8 - 7.6 gm/dL    Albumin 3.8 3.4 - 4.8 g/dL    Globulin 3.4 2.4 - 3.5 gm/dL    Albumin/Globulin Ratio 1.1 1.1 - 2.0 ratio    Albumin, SPEP 52.86 48.1 - 59.5    Alpha 1 Glob % 3.84 2.3 - 4.9    Alpha 1 Glob 0.28 0.00 - 0.40 gm/dl    Alpha 2 Glob% 11.38 6.9 - 13    Alpha 2 Glob 0.82 0.40 - 1.00 gm/dl    Beta Glob % 16.84 13.8 - 19.7    Beta Glob 1.21 0.70 - 1.30 gm/dl    Gamma Globulin % 15.09 10.1 - 21.9    Gamma Globulin 1.09 0.40 - 1.80 gm/dl    M Brandan %      M Brandan     Pathologist Interpretation    Collection Time: 06/12/25 10:13 AM   Result Value Ref Range    Pathology Review       SPEP: No M-spike indicative of a monoclonal protein is identified.    DAVE:  Immunofixation shows a normal pattern of immunoglobulins.    No monoclonal bands are detected.    Weston Pfeiffer M.D.       Assessment:     1. Dermatitis        Plan:   Medication sent to pharmacy take as prescribed  Recommend to begin over-the-counter antihistamine such as Claritin or Zyrtec  May use Benadryl at bedtime as needed  Use a cold wet washcloth to affected areas to reduce itching   Take either warm or cool bath with oatmeal products such as Aveeno for skin soothing   Avoid direct sunlight   Leave the rash open to air as much as possible   Call or seek immediate medical attention if your rash gets worse, fever, stiff neck, nausea or  vomiting, if you have increased pain, swelling, warmth, red streaks, pus draining from the rash, or if you have any new or worrisome symptoms that arise at home or if you do not get better as expected.   If no symptom improvement within the 5-7 days, please call or return to clinic and we will consider oral steroids.     Dermatitis    Other orders  -     triamcinolone acetonide 0.1% (KENALOG) 0.1 % cream; Apply topically 2 (two) times daily. for 7 days  Dispense: 28.4 g; Refill: 0                         [1]   Social History  Tobacco Use    Smoking status: Never    Smokeless tobacco: Never   Substance Use Topics    Alcohol use: Not Currently    Drug use: Never

## 2025-06-25 ENCOUNTER — CLINICAL SUPPORT (OUTPATIENT)
Dept: INTERNAL MEDICINE | Facility: CLINIC | Age: 65
End: 2025-06-25
Payer: COMMERCIAL

## 2025-06-25 DIAGNOSIS — Z23 NEED FOR SHINGLES VACCINE: Primary | ICD-10-CM

## 2025-06-25 PROCEDURE — 90471 IMMUNIZATION ADMIN: CPT | Mod: PBBFAC

## 2025-06-25 PROCEDURE — 90750 HZV VACC RECOMBINANT IM: CPT | Mod: PBBFAC

## 2025-06-25 PROCEDURE — 99212 OFFICE O/P EST SF 10 MIN: CPT | Mod: PBBFAC,25

## 2025-06-25 RX ADMIN — ZOSTER VACCINE RECOMBINANT, ADJUVANTED 0.5 ML: KIT at 11:06

## 2025-07-14 ENCOUNTER — OFFICE VISIT (OUTPATIENT)
Dept: ORTHOPEDICS | Facility: CLINIC | Age: 65
End: 2025-07-14
Payer: COMMERCIAL

## 2025-07-14 VITALS
DIASTOLIC BLOOD PRESSURE: 78 MMHG | BODY MASS INDEX: 31.28 KG/M2 | WEIGHT: 170 LBS | HEIGHT: 62 IN | SYSTOLIC BLOOD PRESSURE: 135 MMHG | HEART RATE: 60 BPM

## 2025-07-14 DIAGNOSIS — M18.0 PRIMARY OSTEOARTHRITIS OF BOTH FIRST CARPOMETACARPAL JOINTS: Primary | ICD-10-CM

## 2025-07-14 PROCEDURE — 3061F NEG MICROALBUMINURIA REV: CPT | Mod: CPTII,,, | Performed by: ORTHOPAEDIC SURGERY

## 2025-07-14 PROCEDURE — 3075F SYST BP GE 130 - 139MM HG: CPT | Mod: CPTII,,, | Performed by: ORTHOPAEDIC SURGERY

## 2025-07-14 PROCEDURE — 3066F NEPHROPATHY DOC TX: CPT | Mod: CPTII,,, | Performed by: ORTHOPAEDIC SURGERY

## 2025-07-14 PROCEDURE — 20600 DRAIN/INJ JOINT/BURSA W/O US: CPT | Mod: RT,,, | Performed by: ORTHOPAEDIC SURGERY

## 2025-07-14 PROCEDURE — 3044F HG A1C LEVEL LT 7.0%: CPT | Mod: CPTII,,, | Performed by: ORTHOPAEDIC SURGERY

## 2025-07-14 PROCEDURE — 3078F DIAST BP <80 MM HG: CPT | Mod: CPTII,,, | Performed by: ORTHOPAEDIC SURGERY

## 2025-07-14 PROCEDURE — 1159F MED LIST DOCD IN RCRD: CPT | Mod: CPTII,,, | Performed by: ORTHOPAEDIC SURGERY

## 2025-07-14 PROCEDURE — 3008F BODY MASS INDEX DOCD: CPT | Mod: CPTII,,, | Performed by: ORTHOPAEDIC SURGERY

## 2025-07-14 PROCEDURE — 99214 OFFICE O/P EST MOD 30 MIN: CPT | Mod: 25,,, | Performed by: ORTHOPAEDIC SURGERY

## 2025-07-14 PROCEDURE — 1160F RVW MEDS BY RX/DR IN RCRD: CPT | Mod: CPTII,,, | Performed by: ORTHOPAEDIC SURGERY

## 2025-07-14 RX ADMIN — BETAMETHASONE SODIUM PHOSPHATE AND BETAMETHASONE ACETATE 6 MG: 3; 3 INJECTION, SUSPENSION INTRA-ARTICULAR; INTRALESIONAL; INTRAMUSCULAR; SOFT TISSUE at 10:07

## 2025-07-14 RX ADMIN — LIDOCAINE HYDROCHLORIDE 1 ML: 20 INJECTION, SOLUTION INFILTRATION; PERINEURAL at 10:07

## 2025-07-14 NOTE — PROCEDURES
Small Joint Aspiration/Injection: R thumb CMC    Date/Time: 7/14/2025 10:15 AM    Performed by: Ivan Ruelas MD  Authorized by: Ivan Ruelas MD    Consent Done?:  Yes (Verbal)  Indications:  Arthritis and pain  Site marked: the procedure site was marked    Timeout: prior to procedure the correct patient, procedure, and site was verified    Prep: patient was prepped and draped in usual sterile fashion      Location:  Thumb  Site:  R thumb CMC  Medications:  1 mL LIDOcaine HCL 20 mg/ml (2%) 20 mg/mL (2 %); 6 mg betamethasone acetate-betamethasone sodium phosphate 6 mg/mL  Patient tolerance:  Patient tolerated the procedure well with no immediate complications

## 2025-07-14 NOTE — PROGRESS NOTES
"Subjective:    CC: Wrist Pain (SORAYA wrist pain. Today left wrist is worse than right. Patient denies numbness and tingling. She states she just has pain that radiates into her thumb )       HPI:  Returns today for repeat exam.  Patient continues to have pain in both wrist, mainly at both thumbs.  She has tried her splints, hand exercises, rest medication, she has had a previous injection as well over 3 months ago and has done well with.  She would like to hold off on surgery as well.    ROS: Refer to HPI for pertinent ROS. All other 12 point systems negative.    Objective:  Vitals:    07/14/25 1046 07/14/25 1047   BP: (!) 147/75 135/78   BP Location: Left arm Left arm   Patient Position: Sitting Sitting   Pulse: (!) 56 60   Weight: 77.1 kg (169 lb 15.6 oz)    Height: 5' 2" (1.575 m)         Physical Exam:  Right upper extremity compartment soft and warm.  Skin is intact.  There is no signs or symptoms of DVT or infection.  She remains be point tender along the base of the thumb positive CMC grind mild subluxation nontender elsewhere no triggering, negative Finkelstein exam, neurovascular intact distally.    Images: . Images Reviewed and discussed with patient.    Assessment:  1. Primary osteoarthritis of both first carpometacarpal joints  - Small Joint Aspiration/Injection: R thumb CMC        Plan:  At this time we discussed her physical exam and previous imaging findings.  We have discussed additional conservative treatments well surgical intervention.  We have discussed her thumb spica bracing, hand exercises anti-inflammatories with appropriate precautions.  Under sterile technique she tolerated the steroid injection very well to the CMC joint of the right thumb.  I would like see back with any problems or difficulties.    Follow UP: No follow-ups on file.    Small Joint Aspiration/Injection: R thumb CMC    Date/Time: 7/14/2025 10:15 AM    Performed by: Ivan Ruelas MD  Authorized by: Ivan Ruelas MD  "   Consent Done?:  Yes (Verbal)  Indications:  Arthritis and pain  Site marked: the procedure site was marked    Timeout: prior to procedure the correct patient, procedure, and site was verified    Prep: patient was prepped and draped in usual sterile fashion      Location:  Thumb  Site:  R thumb CMC  Medications:  1 mL LIDOcaine HCL 20 mg/ml (2%) 20 mg/mL (2 %); 6 mg betamethasone acetate-betamethasone sodium phosphate 6 mg/mL  Patient tolerance:  Patient tolerated the procedure well with no immediate complications

## 2025-07-17 RX ORDER — BETAMETHASONE SODIUM PHOSPHATE AND BETAMETHASONE ACETATE 3; 3 MG/ML; MG/ML
6 INJECTION, SUSPENSION INTRA-ARTICULAR; INTRALESIONAL; INTRAMUSCULAR; SOFT TISSUE
Status: DISCONTINUED | OUTPATIENT
Start: 2025-07-14 | End: 2025-07-17 | Stop reason: HOSPADM

## 2025-07-17 RX ORDER — LIDOCAINE HYDROCHLORIDE 20 MG/ML
1 INJECTION, SOLUTION INFILTRATION; PERINEURAL
Status: DISCONTINUED | OUTPATIENT
Start: 2025-07-14 | End: 2025-07-17 | Stop reason: HOSPADM

## (undated) DEVICE — KIT SURGICAL COLON .25 1.1OZ

## (undated) DEVICE — SOL IRRI STRL WATER 1000ML

## (undated) DEVICE — MANIFOLD 4 PORT

## (undated) DEVICE — FORCEP ALLIGATOR 2.8MM W/NDL